# Patient Record
Sex: MALE | Employment: UNEMPLOYED | URBAN - METROPOLITAN AREA
[De-identification: names, ages, dates, MRNs, and addresses within clinical notes are randomized per-mention and may not be internally consistent; named-entity substitution may affect disease eponyms.]

---

## 2021-02-28 ENCOUNTER — HOSPITAL ENCOUNTER (EMERGENCY)
Age: 36
Discharge: HOME | End: 2021-03-01
Attending: EMERGENCY MEDICINE
Payer: MEDICARE

## 2021-02-28 DIAGNOSIS — F20.9 SCHIZOPHRENIA, UNSPECIFIED TYPE (HCC): Primary | ICD-10-CM

## 2021-02-28 PROCEDURE — 99283 EMERGENCY DEPT VISIT LOW MDM: CPT

## 2021-03-01 ENCOUNTER — APPOINTMENT (OUTPATIENT)
Dept: GENERAL RADIOLOGY | Age: 36
End: 2021-03-01
Attending: NURSE PRACTITIONER
Payer: MEDICARE

## 2021-03-01 VITALS
DIASTOLIC BLOOD PRESSURE: 74 MMHG | RESPIRATION RATE: 16 BRPM | WEIGHT: 210 LBS | HEART RATE: 72 BPM | SYSTOLIC BLOOD PRESSURE: 117 MMHG | HEIGHT: 70 IN | OXYGEN SATURATION: 99 % | BODY MASS INDEX: 30.06 KG/M2 | TEMPERATURE: 98.8 F

## 2021-03-01 LAB
ALBUMIN SERPL-MCNC: 3.2 G/DL (ref 3.5–5)
ALBUMIN/GLOB SERPL: 0.9 {RATIO} (ref 1.1–2.2)
ALP SERPL-CCNC: 55 U/L (ref 45–117)
ALT SERPL-CCNC: 35 U/L (ref 12–78)
AMPHET UR QL SCN: NEGATIVE
ANION GAP SERPL CALC-SCNC: 5 MMOL/L (ref 5–15)
APPEARANCE UR: CLEAR
AST SERPL W P-5'-P-CCNC: 60 U/L (ref 15–37)
BACTERIA URNS QL MICRO: NEGATIVE /HPF
BARBITURATES UR QL SCN: NEGATIVE
BASOPHILS # BLD: 0 K/UL (ref 0–0.1)
BASOPHILS NFR BLD: 0 % (ref 0–1)
BENZODIAZ UR QL: NEGATIVE
BILIRUB SERPL-MCNC: 0.7 MG/DL (ref 0.2–1)
BILIRUB UR QL: NEGATIVE
BUN SERPL-MCNC: 18 MG/DL (ref 6–20)
BUN/CREAT SERPL: 20 (ref 12–20)
CA-I BLD-MCNC: 8.7 MG/DL (ref 8.5–10.1)
CANNABINOIDS UR QL SCN: NEGATIVE
CHLORIDE SERPL-SCNC: 105 MMOL/L (ref 97–108)
CO2 SERPL-SCNC: 29 MMOL/L (ref 21–32)
COCAINE UR QL SCN: NEGATIVE
COLOR UR: ABNORMAL
CREAT SERPL-MCNC: 0.91 MG/DL (ref 0.7–1.3)
DIFFERENTIAL METHOD BLD: ABNORMAL
DRUG SCRN COMMENT,DRGCM: NORMAL
EOSINOPHIL # BLD: 0.2 K/UL (ref 0–0.4)
EOSINOPHIL NFR BLD: 3 % (ref 0–7)
ERYTHROCYTE [DISTWIDTH] IN BLOOD BY AUTOMATED COUNT: 13.2 % (ref 11.5–14.5)
GLOBULIN SER CALC-MCNC: 3.4 G/DL (ref 2–4)
GLUCOSE SERPL-MCNC: 105 MG/DL (ref 65–100)
GLUCOSE UR STRIP.AUTO-MCNC: NEGATIVE MG/DL
HCT VFR BLD AUTO: 40.1 % (ref 36.6–50.3)
HGB BLD-MCNC: 13 G/DL (ref 12.1–17)
HGB UR QL STRIP: NEGATIVE
IMM GRANULOCYTES # BLD AUTO: 0 K/UL (ref 0–0.04)
IMM GRANULOCYTES NFR BLD AUTO: 0 % (ref 0–0.5)
KETONES UR QL STRIP.AUTO: NEGATIVE MG/DL
LEUKOCYTE ESTERASE UR QL STRIP.AUTO: NEGATIVE
LYMPHOCYTES # BLD: 1.5 K/UL (ref 0.8–3.5)
LYMPHOCYTES NFR BLD: 31 % (ref 12–49)
MCH RBC QN AUTO: 29.4 PG (ref 26–34)
MCHC RBC AUTO-ENTMCNC: 32.4 G/DL (ref 30–36.5)
MCV RBC AUTO: 90.7 FL (ref 80–99)
METHADONE UR QL: NEGATIVE
MONOCYTES # BLD: 0.7 K/UL (ref 0–1)
MONOCYTES NFR BLD: 14 % (ref 5–13)
NEUTS SEG # BLD: 2.6 K/UL (ref 1.8–8)
NEUTS SEG NFR BLD: 52 % (ref 32–75)
NITRITE UR QL STRIP.AUTO: NEGATIVE
OPIATES UR QL: NEGATIVE
PCP UR QL: NEGATIVE
PH UR STRIP: 7 [PH] (ref 5–8)
PLATELET # BLD AUTO: 135 K/UL (ref 150–400)
PMV BLD AUTO: 10.8 FL (ref 8.9–12.9)
POTASSIUM SERPL-SCNC: 3.6 MMOL/L (ref 3.5–5.1)
PROT SERPL-MCNC: 6.6 G/DL (ref 6.4–8.2)
PROT UR STRIP-MCNC: NEGATIVE MG/DL
RBC # BLD AUTO: 4.42 M/UL (ref 4.1–5.7)
RBC #/AREA URNS HPF: ABNORMAL /HPF (ref 0–5)
SARS-COV-2, COV2: NORMAL
SARS-COV-2, COV2: NOT DETECTED
SODIUM SERPL-SCNC: 139 MMOL/L (ref 136–145)
SP GR UR REFRACTOMETRY: 1.03 (ref 1–1.03)
UA: UC IF INDICATED,UAUC: ABNORMAL
UROBILINOGEN UR QL STRIP.AUTO: 4 EU/DL (ref 0.1–1)
WBC # BLD AUTO: 5 K/UL (ref 4.1–11.1)
WBC URNS QL MICRO: ABNORMAL /HPF (ref 0–4)

## 2021-03-01 PROCEDURE — 85025 COMPLETE CBC W/AUTO DIFF WBC: CPT

## 2021-03-01 PROCEDURE — 80307 DRUG TEST PRSMV CHEM ANLYZR: CPT

## 2021-03-01 PROCEDURE — 87635 SARS-COV-2 COVID-19 AMP PRB: CPT

## 2021-03-01 PROCEDURE — 71045 X-RAY EXAM CHEST 1 VIEW: CPT

## 2021-03-01 PROCEDURE — 36415 COLL VENOUS BLD VENIPUNCTURE: CPT

## 2021-03-01 PROCEDURE — 81001 URINALYSIS AUTO W/SCOPE: CPT

## 2021-03-01 PROCEDURE — 80053 COMPREHEN METABOLIC PANEL: CPT

## 2021-03-01 NOTE — BSMART NOTE
Placement Devorah from D19 reports Pt has been accepted to [facility] pending medications being called into pharmacy. Writer will follow up w/ psychiatry regarding meds and report back to D19.

## 2021-03-01 NOTE — ED NOTES
Bedside and Verbal shift change report given to Jacey Simon RN (oncoming nurse) by Renzo Sepulveda RN (offgoing nurse). Report included the following information SBAR & care transferred.

## 2021-03-01 NOTE — BSMART NOTE
A face to face assessment was done in Rm 2 with Mitra JACOBS on [employer] for assessment. Pt is  30year old male brought to the ER by police under a paperless ECO for a psychiatric evaluation. Pt was alert and oriented x3, appearance was disheveled and malodorous, affect was anxious, behaviors were WNL, attitude was easily distracted otherwise cooperative, eye contact was intact, speech was stuttered, thought process was slow, thought content was focused, insight and judgment was poor. Pt was not observed to be responding to internal stimuli and denied AVH. According to the police, pt was found wandering on the streets and is reported to be a missing adult in the state of [place]. Pt reported to writer and D19 that he is asking to be helped to get services. D19 evaluator asked pt what services he needed and pt reported that he wants to go live in a group home or get a place for himself. Pt went on to report that he does not want to go back to his mother's pace. Pt shared that he and his mother do not get along and that when they get into arguments/ physical fights his mother calls the police on him. Pt reported Lokesh Casas has called the police 27 times\". Pt reported that its usually his mother who starts this fights and it usually ends up with her calling the police on him. Pt reported that he wants to be independent from his mother and would like to get services that can assist him with that. Pt reported that he receives $800 check from [employer]. Pt denied SI/HI and AVH. Pt shared that he has not slept in a few days. Pt reported his appetite to be good but reported that he had not eaten all day. Pt reported that he drinks alcohol occasionally. He reported that he has not had an alcoholic drink in\" a long time. \" Pt reported history of IP treatment with most recent hospitalization at [address] [place] [address] is currently not receiving any OP treatment and is not on any medication. Pt denied legal and medical history. Pt reported that he used to be in special classes while he was in school. Pt listed his friend Santiago Vaughn as his support system. He reported that his plan was to go to [place] to see Santiago Vaughn who he reports is his girlfriend. Pt is currently homeless but reported that he was living with his mother. He reported that he has lived with his mother for 8 years. Mitra AVITIA9 and Avril clemons Reach evaluated pt. Awaiting for D19 dispostion. Attempted to call Dr. Siria Vásquez x4, with no response. Writer left a detailed voicemail asking her to call back. At [address] [facility] called and reported that they are working on getting pt at the [address] [place] [address] or at [facility] stabilization Unit.

## 2021-03-01 NOTE — ED PROVIDER NOTES
EMERGENCY DEPARTMENT HISTORY AND PHYSICAL EXAM      Date: 2/28/2021  Patient Name: Abhay Jiménez    History of Presenting Illness     Chief Complaint   Patient presents with    Other       History Provided By: Behavioral health and Law Enforcement    HPI: Abhay Jiménez, 30 y.o. male with a past medical history significant for intellectual disability and schizophrenia presents to the ED accompanied by police for evaluation. Patient was found walking along the street. He has been identified as a missing adult from [place]. Patient is a poor historian. He repeatedly states he is going to [place] as he does not want to live with his mother anymore. He states he and his mother fight all the time and the police come to his house often. He advises she does not have POA over him and he would like to [employer]\". He denies any previous medical history however he states he has been in and out of the hospital multiple times for unknown reasons. He admits he has been given medications previously but he is unsure what for. He denies any complaints today, he specifically denies fever/chills, chest pain, shortness of breath, N/V or dizziness. He denies any SI/HI or hallucinations. He admits to poor sleeping recently. PCP: None    No current facility-administered medications on file prior to encounter. No current outpatient medications on file prior to encounter. Past History     Past Medical History:  History reviewed. No pertinent past medical history. Past Surgical History:  History reviewed. No pertinent surgical history. Family History:  History reviewed. No pertinent family history.     Social History:  Social History     Tobacco Use    Smoking status: Never Smoker    Smokeless tobacco: Never Used   Substance Use Topics    Alcohol use: Never     Frequency: Never    Drug use: Never       Allergies:  No Known Allergies      Review of Systems     Review of Systems   Constitutional: Negative for chills and fever. Respiratory: Negative. Negative for shortness of breath. Cardiovascular: Negative. Negative for chest pain. Gastrointestinal: Negative for nausea and vomiting. Neurological: Negative. Negative for dizziness. Psychiatric/Behavioral: Positive for sleep disturbance. Negative for hallucinations and suicidal ideas. All other systems reviewed and are negative. Physical Exam     Physical Exam  Vitals signs and nursing note reviewed. Constitutional:       General: He is not in acute distress. Appearance: He is overweight. Comments: Unkempt   HENT:      Head: Normocephalic and atraumatic. Eyes:      Extraocular Movements: Extraocular movements intact. Conjunctiva/sclera: Conjunctivae normal.      Pupils: Pupils are equal, round, and reactive to light. Neck:      Musculoskeletal: Normal range of motion and neck supple. Cardiovascular:      Rate and Rhythm: Normal rate and regular rhythm. Heart sounds: Normal heart sounds. Pulmonary:      Effort: Pulmonary effort is normal.      Breath sounds: Normal breath sounds. No wheezing or rales. Abdominal:      General: Bowel sounds are normal.      Palpations: Abdomen is soft. Tenderness: There is no abdominal tenderness. Skin:     General: Skin is warm and dry. Neurological:      Mental Status: He is alert and oriented to person, place, and time. Psychiatric:         Mood and Affect: Mood normal.         Speech: Speech is delayed. Behavior: Behavior normal. Behavior is cooperative.          Lab and Diagnostic Study Results     Labs -     Recent Results (from the past 12 hour(s))   SARS-COV-2    Collection Time: 03/01/21  3:30 AM   Result Value Ref Range    SARS-CoV-2 Please find results under separate order     CBC WITH AUTOMATED DIFF    Collection Time: 03/01/21  3:50 AM   Result Value Ref Range    WBC 5.0 4.1 - 11.1 K/uL    RBC 4.42 4.10 - 5.70 M/uL    HGB 13.0 12.1 - 17.0 g/dL    HCT 40.1 36.6 - 50.3 %    MCV 90.7 80.0 - 99.0 FL    MCH 29.4 26.0 - 34.0 PG    MCHC 32.4 30.0 - 36.5 g/dL    RDW 13.2 11.5 - 14.5 %    PLATELET 552 (L) 720 - 400 K/uL    MPV 10.8 8.9 - 12.9 FL    NEUTROPHILS 52 32 - 75 %    LYMPHOCYTES 31 12 - 49 %    MONOCYTES 14 (H) 5 - 13 %    EOSINOPHILS 3 0 - 7 %    BASOPHILS 0 0 - 1 %    IMMATURE GRANULOCYTES 0 0.0 - 0.5 %    ABS. NEUTROPHILS 2.6 1.8 - 8.0 K/UL    ABS. LYMPHOCYTES 1.5 0.8 - 3.5 K/UL    ABS. MONOCYTES 0.7 0.0 - 1.0 K/UL    ABS. EOSINOPHILS 0.2 0.0 - 0.4 K/UL    ABS. BASOPHILS 0.0 0.0 - 0.1 K/UL    ABS. IMM. GRANS. 0.0 0.00 - 0.04 K/UL    DF AUTOMATED     METABOLIC PANEL, COMPREHENSIVE    Collection Time: 03/01/21  3:50 AM   Result Value Ref Range    Sodium 139 136 - 145 mmol/L    Potassium 3.6 3.5 - 5.1 mmol/L    Chloride 105 97 - 108 mmol/L    CO2 29 21 - 32 mmol/L    Anion gap 5 5 - 15 mmol/L    Glucose 105 (H) 65 - 100 mg/dL    BUN 18 6 - 20 mg/dL    Creatinine 0.91 0.70 - 1.30 mg/dL    BUN/Creatinine ratio 20 12 - 20      GFR est AA >60 >60 ml/min/1.73m2    GFR est non-AA >60 >60 ml/min/1.73m2    Calcium 8.7 8.5 - 10.1 mg/dL    Bilirubin, total 0.7 0.2 - 1.0 mg/dL    AST (SGOT) 60 (H) 15 - 37 U/L    ALT (SGPT) 35 12 - 78 U/L    Alk. phosphatase 55 45 - 117 U/L    Protein, total 6.6 6.4 - 8.2 g/dL    Albumin 3.2 (L) 3.5 - 5.0 g/dL    Globulin 3.4 2.0 - 4.0 g/dL    A-G Ratio 0.9 (L) 1.1 - 2.2         Radiologic Studies -   @lastxrresult@  CT Results  (Last 48 hours)    None        CXR Results  (Last 48 hours)    None            Medical Decision Making   - I am the first provider for this patient. - I reviewed the vital signs, available nursing notes, past medical history, past surgical history, family history and social history. - Initial assessment performed. The patients presenting problems have been discussed, and they are in agreement with the care plan formulated and outlined with them.   I have encouraged them to ask questions as they arise throughout their visit. Vital Signs-Reviewed the patient's vital signs. Patient Vitals for the past 12 hrs:   Temp Pulse Resp BP SpO2   03/01/21 0016     99 %   02/28/21 2252 98.8 °F (37.1 °C) 84 16 (!) 131/91 99 %       Records Reviewed: Nursing Notes      ED Course:     ED Course as of Mar 05 1242   Mon Mar 01, 2021   3414 Per behavioral health/[facility] 19 patient is a candidate for REACH. Basic lab screening and Covid test requested for medical clearance    [LP]   0658 Sodium: 139 [HP]   0658 AMPHETAMINES: Negative [HP]   0658 BARBITURATES: Negative [HP]   0658 BENZODIAZEPINES: Negative [HP]   0658 COCAINE: Negative [HP]   5243 METHADONE: Negative [HP]   5335 OPIATES: Negative [HP]   8384 PCP(PHENCYCLIDINE): Negative [HP]   0658 THC (TH-CANNABINOL): Negative [HP]   0658 WBC: 5.0 [HP]   0658 HGB: 13.0 [HP]   0658 HCT: 40.1 [HP]   0658 PLATELET(!): 858 [HP]   0658 Leukocyte Esterase: Negative [HP]   0658 UA:UC IF INDICATED: PENDING [HP]   0659 Nitrites: Negative [HP]   0659 Potassium: 3.6 [HP]   0659 Chloride: 105 [HP]   0659 CO2: 29 [HP]   1924 Anion gap: 5 [HP]   0659 Glucose(!): 105 [HP]   0659 BUN: 18 [HP]   0659 BUN/Creatinine ratio: 20 [HP]   1253 GFR est AA: >60 [HP]   7777 Medically clear, REACH placement pending at signover   SARS-CoV-2: Not Detected [HP]      ED Course User Index  [HP] Sonali White MD  [LP] Naldo Silva NP     BEDSIDE SIGN OUT:  5:29 AM  I have discussed pt's hx, disposition, and available diagnostic and imaging results with Dr Claudell Moles at bedside with the patient. Reviewed care plans. Both providers and patient are in agreement with care plan. CLARISA Craig is transferring care of the pt to Dr Claudell Moles at this time. Ashley Joya NP    Provider Notes (Medical Decision Making):   Patient presents accompanied by police as an ECO after found walking down the street. He was identified as a missing adult from [place].   History is limited as patient is a poor historian and has intellectual disability. Patient has no specific complaints, no SI/HI or hallucinations. District  consulted, plan is to transfer patient to [facility] after medical clearance. Cleared medically and by psychiatry, Dr Charisma Hernandez for transfer. MDM       Disposition   Disposition: Condition stable  DC-discharged to crisis stabilization unit, RBHA and regimen        DISCHARGE PLAN:  1. There are no discharge medications for this patient. 2.   Follow-up Information    None       3. Return to ED if worse   4. There are no discharge medications for this patient. Diagnosis     Clinical Impression:   1. Schizophrenia, unspecified type [facility])        Attestations:    Myra Victor NP    Please note that this dictation was completed with [employer], the computer voice recognition software. Quite often unanticipated grammatical, syntax, homophones, and other interpretive errors are inadvertently transcribed by the computer software. Please disregard these errors. Please excuse any errors that have escaped final proofreading. Thank you.

## 2021-03-01 NOTE — ED NOTES
Gave report to EMS. Chart provided. Called Avril at receiving of ETA. Pt alert, maew, skin w/d. Cooperative. Belongings with patient. Transfer packet completed.

## 2021-03-01 NOTE — CONSULTS
[address]    Name:  Elaina Lombard  MR#:  [id number]  :  1985  ACCOUNT #:  [de-identified]  DATE OF SERVICE:  2021    PSYCHIATRIC CONSULTATION    DATE OF ADMISSION:  2021    REASON FOR CONSULTATION:  Psych consult, medications for disposition. This is a 29-year-old single [facility] male patient apparently brought to the emergency room under ECO by [facility] [employer] for psychiatric evaluation and disposition. Telephone contact by intake staff last night and this morning, the patient was seen face-to-face today, wants help, homeless. HISTORY OF PRESENT ILLNESS:  Apparently, this is a missing person from [place], the patient with history of chronic mental illness, some intellectual disability, reportedly left [place] and wandering here in [place] . Police department picked him up and he was asking to get services. Apparently, on the way, he was hospitalized in [facility], claims to be on fluphenazine one tablet, Cogentin one tablet 1 mg, trazodone 50 mg, does not know the doses and the frequency. He does not want to be placed in a group home. He says he and his mother live together. She constantly calls police on him and apparently at least called 27 times. He does not want to go back. He wants to be independent of his mother. They do not get along with each other. Reportedly, he gets 800 dollars social security check from [facility]. Mother fights with him, denied any auditory or visual hallucinations, suicidal or homicidal ideations, but little bit disheveled, not slept in a few days, appetite to be good but not eaten today. PAST HISTORY:  Admissions:  Most recently in [facility]. SUBSTANCE ABUSE:  Alcohol occasionally. No drug abuse. Drug screen was negative. TRAUMA:  Not known. PHYSICAL:  Unremarkable. ALLERGIES TO MEDICATIONS:   NO KNOWN ALLERGIES TO MEDICATIONS.     FAMILY HISTORY:  Unknown, but he and his mom fight.    MENTAL STATUS EXAM:  Looks stated age. Alert, verbal, little bit guarded, flat affect. Denied suicidal thoughts or homicidal thoughts, does not appear to be having hallucinations or delusions. Seems to be intellectually limited, insight limited, judgment poor by history but does want to be helped with housing, medications, etc.  No agitation. No aggression. Not suicidal or homicidal.  Oriented to place, person, and day. DIAGNOSES:  Schizophrenia, chronic undifferentiated with acute exacerbation, mild-to-moderate intellectual disability. DISPOSITION:  The patient not suicidal or homicidal, not overly psychotic but somewhat limited. Intake worked with him and REACH program, able to find a crisis stabilization unit, [facility] in [place]. I was asked to call, they need a 30-day supply of medications. Thirty-day supply of medications was called into [facility], [phone], fluphenazine 10 mg one at night, 30 tablets; no refills; Cogentin 1 mg b.i.d., #60, no refills; trazodone 50 mg one at bedtime p.r.n. for insomnia, #30; no refills. Discharged as improved.       MD LUAN [email or web address]  D:  03/01/2021 11:33  T:  03/01/2021 14:58  JOB #:  [id number]

## 2021-03-01 NOTE — ED NOTES
Pt awakened to eat his breakfast. States he is doing fine and feels he can manage eating his breakfast

## 2021-03-01 NOTE — ED TRIAGE NOTES
Pd was called for assist on someone walking the streets. Jefferson ran the patient finding that he was a missing adult from [place]. Rekha.  arrive with paperless eco.

## 2021-03-01 NOTE — BSMART NOTE
Meds 
 
Pts meds can be called in to [facility] [address] [place] [address] [phone] According to chart Pt was on Fluphenazine Cogentin 1mg Haldol 2mg Trazodone 50 mg

## 2021-10-27 ENCOUNTER — OFFICE VISIT (OUTPATIENT)
Dept: PODIATRY | Age: 36
End: 2021-10-27
Payer: MEDICARE

## 2021-10-27 VITALS
HEIGHT: 70 IN | BODY MASS INDEX: 41.49 KG/M2 | WEIGHT: 289.8 LBS | SYSTOLIC BLOOD PRESSURE: 141 MMHG | TEMPERATURE: 98.3 F | HEART RATE: 82 BPM | DIASTOLIC BLOOD PRESSURE: 88 MMHG

## 2021-10-27 DIAGNOSIS — B35.3 TINEA PEDIS OF BOTH FEET: Primary | ICD-10-CM

## 2021-10-27 PROCEDURE — 99203 OFFICE O/P NEW LOW 30 MIN: CPT | Performed by: PODIATRIST

## 2021-10-27 PROCEDURE — G8432 DEP SCR NOT DOC, RNG: HCPCS | Performed by: PODIATRIST

## 2021-10-27 PROCEDURE — G8427 DOCREV CUR MEDS BY ELIG CLIN: HCPCS | Performed by: PODIATRIST

## 2021-10-27 PROCEDURE — G8417 CALC BMI ABV UP PARAM F/U: HCPCS | Performed by: PODIATRIST

## 2021-10-27 RX ORDER — TRAZODONE HYDROCHLORIDE 150 MG/1
150 TABLET ORAL
COMMUNITY

## 2021-10-27 RX ORDER — RISPERIDONE 2 MG/1
2 TABLET, FILM COATED ORAL
COMMUNITY
End: 2022-10-12 | Stop reason: DRUGHIGH

## 2021-10-27 RX ORDER — CLOTRIMAZOLE AND BETAMETHASONE DIPROPIONATE 10; .64 MG/G; MG/G
CREAM TOPICAL 2 TIMES DAILY
Qty: 45 G | Refills: 0 | Status: SHIPPED | OUTPATIENT
Start: 2021-10-27

## 2021-10-27 NOTE — PROGRESS NOTES
1. Have you been to the ER, urgent care clinic since your last visit? Hospitalized since your last visit? No    2. Have you seen or consulted any other health care providers outside of the [address] [place] [address] since your last visit? Include any pap smears or colon screening.  No     Chief Complaint   Patient presents with    Athlete's Foot     bilateral itching/sweaty feet

## 2021-10-28 LAB
CREATININE, EXTERNAL: 1.07
LDL-C, EXTERNAL: 159
TOTAL CHOLESTEROL, NCHOLT: 231

## 2021-11-22 NOTE — PROGRESS NOTES
[facility]    Subjective:         Patient is a 30 y.o. male who is being seen as a new pt for an itchy rash to both of his feet. Patient states he has suffered for the past few weeks with this issue. He states he tried multiple over-the-counter creams/lotions/sprays/powders without relief of his symptoms. He states his feet are also very sweaty. He denies any overt pain but states they are very itchy. He denies any breaks in the skin. He denies any systemic signs of infection. He denies any recent trauma. He denies any changes in his activity level or shoe gear. He denies any other pedal complaints    History reviewed. No pertinent past medical history. History reviewed. No pertinent surgical history. History reviewed. No pertinent family history. Social History     Tobacco Use    Smoking status: Never Smoker    Smokeless tobacco: Never Used   Substance Use Topics    Alcohol use: Never     No Known Allergies  Prior to Admission medications    Medication Sig Start Date End Date Taking? Authorizing Provider   clotrimazole-betamethasone (LOTRISONE) topical cream Apply  to affected area two (2) times a day. 10/27/21  Yes Colton Black DPM   risperiDONE (RisperDAL) 2 mg tablet Take 2 mg by mouth. Yes Provider, Historical   traZODone (DESYREL) 150 mg tablet Take 150 mg by mouth nightly. Yes Provider, Historical       Review of Systems   Constitutional: Negative. HENT: Negative. Eyes: Negative. Respiratory: Negative. Cardiovascular: Negative. Gastrointestinal: Negative. Endocrine: Negative. Genitourinary: Negative. Musculoskeletal: Negative. Skin: Negative. Allergic/Immunologic: Negative. Neurological: Negative. Hematological: Negative. Psychiatric/Behavioral: Positive for sleep disturbance. All other systems reviewed and are negative.       Objective:     Visit Vitals  BP (!) 141/88 (BP 1 Location: Left upper arm, BP Patient Position: Sitting, BP Cuff Size: Adult)   Pulse 82   Temp 98.3 °F (36.8 °C) (Temporal)   Ht 5' 10\" (1.778 m)   Wt 289 lb 12.8 oz (131.5 kg)   BMI 41.58 kg/m²       Physical Exam  Vitals reviewed. Constitutional:       Appearance: He is morbidly obese. Cardiovascular:      Pulses:           Dorsalis pedis pulses are 2+ on the right side and 2+ on the left side. Posterior tibial pulses are 2+ on the right side and 2+ on the left side. Pulmonary:      Effort: Pulmonary effort is normal.   Musculoskeletal:      Right lower leg: No edema. Left lower leg: No edema. Right foot: Normal range of motion. No deformity or bunion. Left foot: Normal range of motion. No deformity or bunion. Feet:      Right foot:      Protective Sensation: 10 sites tested. 10 sites sensed. Skin integrity: Dry skin present. Toenail Condition: Right toenails are normal.      Left foot:      Protective Sensation: 10 sites tested. 10 sites sensed. Skin integrity: Dry skin present. Toenail Condition: Left toenails are normal.   Lymphadenopathy:      Lower Body: No right inguinal adenopathy. No left inguinal adenopathy. Skin:     General: Skin is warm. Capillary Refill: Capillary refill takes 2 to 3 seconds. Findings: Rash present. Neurological:      Mental Status: He is alert and oriented to person, place, and time. Psychiatric:         Mood and Affect: Mood and affect normal.         Behavior: Behavior is cooperative. Data Review: No results found for this or any previous visit (from the past 24 hour(s)). Impression:       ICD-10-CM ICD-9-CM    1.  Tinea pedis of both feet  B35.3 110.4          Recommendation:     Patient seen and evaluated in the office  Discussed and educated patient regarding her current medical condition  A prescription was given for a topical steroid/antifungal cream to be utilized twice daily for a total of 2 weeks for symptomatic relief        Beronica Jameson Claudette Silva, DPWILLIE, CWSP, [facility]  [address] [facility] [address], [address] [place] [address], [address] [place] [address]  O: [phone]  F: [phone]  C: [phone]

## 2021-12-16 ENCOUNTER — OFFICE VISIT (OUTPATIENT)
Dept: PODIATRY | Age: 36
End: 2021-12-16
Payer: MEDICARE

## 2021-12-16 VITALS
HEIGHT: 70 IN | WEIGHT: 298.4 LBS | SYSTOLIC BLOOD PRESSURE: 148 MMHG | DIASTOLIC BLOOD PRESSURE: 93 MMHG | HEART RATE: 99 BPM | TEMPERATURE: 97.7 F | BODY MASS INDEX: 42.72 KG/M2

## 2021-12-16 DIAGNOSIS — M79.671 RIGHT FOOT PAIN: Primary | ICD-10-CM

## 2021-12-16 PROCEDURE — 99213 OFFICE O/P EST LOW 20 MIN: CPT | Performed by: PODIATRIST

## 2021-12-16 PROCEDURE — G8432 DEP SCR NOT DOC, RNG: HCPCS | Performed by: PODIATRIST

## 2021-12-16 PROCEDURE — G8427 DOCREV CUR MEDS BY ELIG CLIN: HCPCS | Performed by: PODIATRIST

## 2021-12-16 PROCEDURE — G8417 CALC BMI ABV UP PARAM F/U: HCPCS | Performed by: PODIATRIST

## 2021-12-16 RX ORDER — DICLOFENAC SODIUM 10 MG/G
4 GEL TOPICAL 4 TIMES DAILY
Qty: 350 G | Refills: 2 | Status: SHIPPED | OUTPATIENT
Start: 2021-12-16

## 2021-12-16 NOTE — PROGRESS NOTES
1. Have you been to the ER, urgent care clinic since your last visit? Hospitalized since your last visit? No    2. Have you seen or consulted any other health care providers outside of the [address] [place] [address] since your last visit? Include any pap smears or colon screening.  No     Chief Complaint   Patient presents with    Foot Pain     bilateral foot pain

## 2021-12-23 ENCOUNTER — HOSPITAL ENCOUNTER (OUTPATIENT)
Dept: GENERAL RADIOLOGY | Age: 36
Discharge: HOME | End: 2021-12-23
Payer: MEDICARE

## 2021-12-23 ENCOUNTER — TRANSCRIBE ORDER (OUTPATIENT)
Dept: EMERGENCY DEPT | Age: 36
End: 2021-12-23

## 2021-12-23 DIAGNOSIS — M79.671 RIGHT FOOT PAIN: Primary | ICD-10-CM

## 2021-12-23 DIAGNOSIS — M79.671 RIGHT FOOT PAIN: ICD-10-CM

## 2021-12-23 PROCEDURE — 73630 X-RAY EXAM OF FOOT: CPT

## 2022-01-04 NOTE — PROGRESS NOTES
[facility]    Subjective:         Patient is a 29 y.o. male who is being seen as a returning pt for right foot pain. Patient states he has been suffering with the right foot pain for the past few days, now with pain rising the level of 7 out of 10. He denies any breaks in the skin. He denies any systemic signs of infection. He denies any recent trauma. He denies any changes in his activity level or shoe gear. He denies any other pedal complaints    History reviewed. No pertinent past medical history. History reviewed. No pertinent surgical history. History reviewed. No pertinent family history. Social History     Tobacco Use    Smoking status: Never Smoker    Smokeless tobacco: Never Used   Substance Use Topics    Alcohol use: Never     No Known Allergies  Prior to Admission medications    Medication Sig Start Date End Date Taking? Authorizing Provider   diclofenac (VOLTAREN) 1 % gel Apply 4 g to affected area four (4) times daily. 12/16/21  Yes Broderick Black DPM   clotrimazole-betamethasone (LOTRISONE) topical cream Apply  to affected area two (2) times a day. 10/27/21   Broderick Black DPM   risperiDONE (RisperDAL) 2 mg tablet Take 2 mg by mouth. Provider, Historical   traZODone (DESYREL) 150 mg tablet Take 150 mg by mouth nightly. Provider, Historical       Review of Systems   Constitutional: Negative. HENT: Negative. Eyes: Negative. Respiratory: Negative. Cardiovascular: Negative. Gastrointestinal: Negative. Endocrine: Negative. Genitourinary: Negative. Musculoskeletal: Negative. Skin: Negative. Allergic/Immunologic: Negative. Neurological: Negative. Hematological: Negative. Psychiatric/Behavioral: Positive for sleep disturbance. All other systems reviewed and are negative.       Objective:     Visit Vitals  BP (!) 148/93 (BP 1 Location: Right upper arm, BP Patient Position: Sitting, BP Cuff Size: Large adult) Pulse 99   Temp 97.7 °F (36.5 °C) (Temporal)   Ht 5' 10\" (1.778 m)   Wt 298 lb 6.4 oz (135.4 kg)   BMI 42.82 kg/m²       Physical Exam  Vitals reviewed. Constitutional:       Appearance: He is morbidly obese. Cardiovascular:      Pulses:           Dorsalis pedis pulses are 2+ on the right side and 2+ on the left side. Posterior tibial pulses are 2+ on the right side and 2+ on the left side. Pulmonary:      Effort: Pulmonary effort is normal.   Musculoskeletal:      Right lower leg: No edema. Left lower leg: No edema. Right foot: Normal range of motion. No deformity or bunion. Left foot: Normal range of motion. No deformity or bunion. Feet:      Right foot:      Protective Sensation: 10 sites tested. 10 sites sensed. Skin integrity: Dry skin present. Toenail Condition: Right toenails are normal.      Left foot:      Protective Sensation: 10 sites tested. 10 sites sensed. Skin integrity: Dry skin present. Toenail Condition: Left toenails are normal.   Lymphadenopathy:      Lower Body: No right inguinal adenopathy. No left inguinal adenopathy. Skin:     General: Skin is warm. Capillary Refill: Capillary refill takes 2 to 3 seconds. Neurological:      Mental Status: He is alert and oriented to person, place, and time. Psychiatric:         Mood and Affect: Mood and affect normal.         Behavior: Behavior is cooperative. Data Review: No results found for this or any previous visit (from the past 24 hour(s)). Impression:       ICD-10-CM ICD-9-CM    1. Right foot pain  M79.671 729.5 XR FOOT RT MIN 3 V         Recommendation:     Patient seen and evaluated in the office  Discussed and educated patient regarding her current medical condition  X-rays to be performed of the right foot to interrogate the osseous structures.   When performed, will discuss treatment options in more depth  A prescription was given for diclofenac 1% topical gel to be applied up to 4 times daily for symptomatic relief      Mahin Dillon, [address] [facility], [address] [place] [address] and Rosetta Carrion [address]  O: [phone]  F: [phone]  C: [phone]

## 2022-10-12 VITALS
DIASTOLIC BLOOD PRESSURE: 80 MMHG | BODY MASS INDEX: 34.99 KG/M2 | HEART RATE: 76 BPM | WEIGHT: 264 LBS | HEIGHT: 73 IN | TEMPERATURE: 98.3 F | SYSTOLIC BLOOD PRESSURE: 108 MMHG

## 2022-10-12 PROBLEM — R73.01 ELEVATED FASTING GLUCOSE: Status: ACTIVE | Noted: 2022-10-12

## 2022-10-12 PROBLEM — E78.2 MIXED HYPERLIPIDEMIA: Status: ACTIVE | Noted: 2022-10-12

## 2022-10-12 PROBLEM — R03.0 ELEVATED BLOOD PRESSURE READING WITHOUT DIAGNOSIS OF HYPERTENSION: Status: ACTIVE | Noted: 2022-10-12

## 2022-10-12 PROBLEM — R06.83 SNORING: Status: ACTIVE | Noted: 2022-10-12

## 2022-10-12 PROBLEM — F31.9 BIPOLAR DISORDER (HCC): Status: ACTIVE | Noted: 2022-10-12

## 2022-10-12 PROBLEM — E66.9 OBESITY: Status: ACTIVE | Noted: 2022-10-12

## 2022-10-12 RX ORDER — ATORVASTATIN CALCIUM 10 MG/1
10 TABLET, FILM COATED ORAL
COMMUNITY
Start: 2022-10-01

## 2022-10-12 RX ORDER — RISPERIDONE 1 MG/1
1 TABLET, FILM COATED ORAL DAILY
COMMUNITY
Start: 2022-10-01

## 2022-10-12 RX ORDER — RISPERIDONE 3 MG/1
3 TABLET, FILM COATED ORAL DAILY
COMMUNITY
Start: 2022-10-01

## 2022-10-12 RX ORDER — METOPROLOL SUCCINATE 25 MG/1
25 TABLET, EXTENDED RELEASE ORAL DAILY
COMMUNITY
Start: 2022-10-01

## 2022-10-18 ENCOUNTER — OFFICE VISIT (OUTPATIENT)
Dept: INTERNAL MEDICINE CLINIC | Age: 37
End: 2022-10-18
Payer: MEDICARE

## 2022-10-18 VITALS
DIASTOLIC BLOOD PRESSURE: 80 MMHG | OXYGEN SATURATION: 96 % | SYSTOLIC BLOOD PRESSURE: 122 MMHG | TEMPERATURE: 97.9 F | HEIGHT: 74 IN | BODY MASS INDEX: 33.3 KG/M2 | WEIGHT: 259.5 LBS | HEART RATE: 63 BPM

## 2022-10-18 DIAGNOSIS — E78.2 MIXED HYPERLIPIDEMIA: ICD-10-CM

## 2022-10-18 DIAGNOSIS — R73.01 ELEVATED FASTING GLUCOSE: ICD-10-CM

## 2022-10-18 DIAGNOSIS — R05.9 COUGH, UNSPECIFIED TYPE: ICD-10-CM

## 2022-10-18 DIAGNOSIS — R03.0 ELEVATED BLOOD PRESSURE READING WITHOUT DIAGNOSIS OF HYPERTENSION: ICD-10-CM

## 2022-10-18 DIAGNOSIS — E66.9 OBESITY, UNSPECIFIED CLASSIFICATION, UNSPECIFIED OBESITY TYPE, UNSPECIFIED WHETHER SERIOUS COMORBIDITY PRESENT: Primary | ICD-10-CM

## 2022-10-18 PROBLEM — E78.00 HYPERCHOLESTEROLEMIA: Status: ACTIVE | Noted: 2022-10-18

## 2022-10-18 PROCEDURE — 99214 OFFICE O/P EST MOD 30 MIN: CPT | Performed by: INTERNAL MEDICINE

## 2022-10-18 RX ORDER — ACETAMINOPHEN 325 MG/1
325 TABLET ORAL
COMMUNITY

## 2022-10-18 RX ORDER — DEXTROMETHORPHAN POLISTIREX 30 MG/5ML
60 SUSPENSION ORAL 2 TIMES DAILY
Qty: 200 ML | Refills: 0 | Status: SHIPPED | OUTPATIENT
Start: 2022-10-18 | End: 2022-10-28

## 2022-10-18 NOTE — ASSESSMENT & PLAN NOTE
Advised to continue his healthy diet, exercise and weight loss. He lost 5 pounds from previous visit.

## 2022-10-18 NOTE — PROGRESS NOTES
[address] [facility]  [address]  [place], [address]  Phone: [phone]      Cathy Moore (: 1985) is a 29 y.o. male, established patient, here for evaluation of the following chief complaint(s):  Follow Up Chronic Condition and Hypertension         SUBJECTIVE/OBJECTIVE:  HPI:  Cathy Moore is being seen today for follow up. He is accompanied with group home director [address] [place] [address], [facility]. Ms Karin Denton informed the previous caregiver has left the group home and she is not sure what he was supposed to do before this appointment. He has not had any recent labwork. He denies any recent hospital, ER, or urgent care visits. He had a sleep study on 10/12/2022 at [facility] and is waiting for the result. He does not need any refills at this time. Patient stated he is trying to eat better, do exercise and trying to lose weight. Group home director asked since the flu season is coming whether he can have  as needed cough medicine in case if he needs. Prior to Admission medications    Medication Sig Start Date End Date Taking? Authorizing Provider   acetaminophen (TYLENOL) 325 mg tablet Take 325 mg by mouth every six (6) hours as needed for Pain. Yes Provider, Historical   dextromethorphan (Delsym) 30 mg/5 mL liquid Take 10 mL by mouth two (2) times a day for 10 days. 10/18/22 10/28/22 Yes Komal Bills MD   atorvastatin (LIPITOR) 10 mg tablet Take 10 mg by mouth nightly. 10/1/22  Yes Provider, Historical   metoprolol succinate (TOPROL-XL) 25 mg XL tablet Take 25 mg by mouth daily. 10/1/22  Yes Provider, Historical   risperiDONE (RisperDAL) 1 mg tablet Take 1 mg by mouth daily. 10/1/22  Yes Provider, Historical   risperiDONE (RisperDAL) 3 mg tablet Take 3 mg by mouth daily. 10/1/22  Yes Provider, Historical   traZODone (DESYREL) 150 mg tablet Take 150 mg by mouth nightly.    Yes Provider, Historical   diclofenac (VOLTAREN) 1 % gel Apply 4 g to affected area four (4) times daily. Patient not taking: Reported on 10/18/2022 12/16/21   Brodie Black, DPM   clotrimazole-betamethasone (LOTRISONE) topical cream Apply  to affected area two (2) times a day. Patient not taking: Reported on 10/18/2022 10/27/21   Freemanne Randy, [facility]        Not on File     Past Medical History:   Diagnosis Date    Bipolar disorder ([address]     Diastolic blood pressure 90 mm Hg or higher     Elevated fasting glucose     Hypercholesterolemia     Obesity (BMI 30-39. 9)     Snoring         Family History   Family history unknown: Yes        History reviewed. No pertinent surgical history. Review of Systems   Constitutional:  Negative for chills and fever. HENT:  Negative for congestion, ear pain, nosebleeds, sinus pain, sore throat and tinnitus. Eyes:  Negative for redness. Respiratory:  Negative for cough and shortness of breath. Cardiovascular:  Negative for chest pain and palpitations. Gastrointestinal:  Negative for abdominal pain, diarrhea, nausea and vomiting. Endocrine: Negative for cold intolerance and polyuria. Genitourinary:  Negative for dysuria and hematuria. Musculoskeletal:  Negative for back pain and neck pain. Skin:  Negative for rash. Neurological:  Negative for dizziness and headaches. Psychiatric/Behavioral: Negative. The patient is not nervous/anxious. /80   Pulse 63   Temp 97.9 °F (36.6 °C) (Temporal)   Ht 6' 2\" (1.88 m)   Wt 259 lb 8 oz (117.7 kg)   SpO2 96%   BMI 33.32 kg/m²      Physical Exam  Constitutional:       Appearance: Normal appearance. Comments: Patient is with his group home director Ms. Luli Shane. HENT:      Head: Normocephalic and atraumatic.       Right Ear: Tympanic membrane and external ear normal.      Left Ear: Tympanic membrane and external ear normal.      Nose: Nose normal.      Mouth/Throat:      Mouth: Mucous membranes are moist.   Eyes:      Extraocular Movements: Extraocular movements intact. Pupils: Pupils are equal, round, and reactive to light. Cardiovascular:      Rate and Rhythm: Normal rate. Pulmonary:      Effort: Pulmonary effort is normal.      Breath sounds: Normal breath sounds. Abdominal:      Palpations: Abdomen is soft. Tenderness: no abdominal tenderness   Musculoskeletal:      Cervical back: Normal range of motion and neck supple. Right lower leg: No edema. Left lower leg: No edema. Skin:     General: Skin is warm and dry. Neurological:      General: No focal deficit present. Mental Status: He is alert and oriented to person, place, and time. Psychiatric:         Mood and Affect: Mood normal.       ASSESSMENT/PLAN:  Below is the assessment and plan developed based on review of pertinent history, physical exam, labs, studies, and medications. 1. Obesity, unspecified classification, unspecified obesity type, unspecified whether serious comorbidity present  Assessment & Plan:  Advised to continue his healthy diet, exercise and weight loss. He lost 5 pounds from previous visit. 2. Elevated fasting glucose  Assessment & Plan:  Advised to decrease sweets, carbohydrates and increase exercise. Orders:  -     HEMOGLOBIN A1C WITH EAG  -     HEMOGLOBIN A1C WITH EAG; Future  3. Mixed hyperlipidemia  Assessment & Plan:  Advise low-fat low-cholesterol diet, will recheck labs new lab slip given to patient. Discussed with group home director regarding the need for blood test.  Orders:  -     LIPID PANEL  -     METABOLIC PANEL, COMPREHENSIVE  -     METABOLIC PANEL, COMPREHENSIVE; Future  -     LIPID PANEL; Future  4. Elevated blood pressure reading without diagnosis of hypertension  Assessment & Plan:  Blood pressure is well controlled, advised to continue current medications and low-sodium diet. 5. Cough, unspecified type  -     dextromethorphan (Delsym) 30 mg/5 mL liquid; Take 10 mL by mouth two (2) times a day for 10 days. , Normal, Disp-200 mL, R-0    Return in about 4 months (around 2/18/2023), or if symptoms worsen or fail to improve. There are no Patient Instructions on file for this visit. Health Maintenance Due   Topic Date Due    Hepatitis C Screening  Never done    COVID-19 Vaccine (1) Never done    A1C test (Diabetic or Prediabetic)  Never done    DTaP/Tdap/Td series (1 - Tdap) Never done    Medicare Yearly Exam  Never done    Flu Vaccine (1) Never done    Lipid Screen  10/28/2022        Aspects of this note may have been generated using voice recognition software. Despite editing, there may be unrecognized errors. An electronic signature was used to authenticate this note.   -- Angie Kang MD

## 2022-10-18 NOTE — PROGRESS NOTES
1. \"Have you been to the ER, urgent care clinic since your last visit? Hospitalized since your last visit? \" No    2. \"Have you seen or consulted any other health care providers outside of the [address] [place] [address] since your last visit? \" Yes When: 10/12/2022, [facility]

## 2022-10-18 NOTE — ASSESSMENT & PLAN NOTE
Advise low-fat low-cholesterol diet, will recheck labs new lab slip given to patient.   Discussed with group home director regarding the need for blood test.

## 2022-11-02 LAB
CREATININE, EXTERNAL: 1.16
HBA1C MFR BLD HPLC: 13 %
LDL-C, EXTERNAL: 107
TOTAL CHOLESTEROL, NCHOLT: 199

## 2022-11-06 NOTE — PROGRESS NOTES
Can you see whether he and his group home caregiver can come for a follow up on 11/10/22 at 2.00PM to discuss the labs,if they can come keep him on the schedule.

## 2022-11-10 RX ORDER — METOPROLOL SUCCINATE 25 MG/1
TABLET, EXTENDED RELEASE ORAL
Qty: 15 TABLET | Refills: 12 | Status: SHIPPED | OUTPATIENT
Start: 2022-11-10

## 2022-11-13 NOTE — PROGRESS NOTES
Can you see whether he and the care taker can come for a follow up on  11/15/22 at 12. Noon to discuss the labs,need to bring all the medications.

## 2022-11-16 ENCOUNTER — OFFICE VISIT (OUTPATIENT)
Dept: INTERNAL MEDICINE CLINIC | Age: 37
End: 2022-11-16
Payer: MEDICARE

## 2022-11-16 VITALS
OXYGEN SATURATION: 97 % | HEIGHT: 73 IN | DIASTOLIC BLOOD PRESSURE: 78 MMHG | BODY MASS INDEX: 33.66 KG/M2 | TEMPERATURE: 98.1 F | HEART RATE: 91 BPM | SYSTOLIC BLOOD PRESSURE: 118 MMHG | WEIGHT: 254 LBS

## 2022-11-16 DIAGNOSIS — E66.9 OBESITY (BMI 30-39.9): ICD-10-CM

## 2022-11-16 DIAGNOSIS — E11.65 UNCONTROLLED TYPE 2 DIABETES MELLITUS WITH HYPERGLYCEMIA (HCC): Primary | ICD-10-CM

## 2022-11-16 DIAGNOSIS — R03.0 DIASTOLIC BLOOD PRESSURE 90 MM HG OR HIGHER: ICD-10-CM

## 2022-11-16 DIAGNOSIS — E78.2 MIXED HYPERLIPIDEMIA: ICD-10-CM

## 2022-11-16 PROCEDURE — 3046F HEMOGLOBIN A1C LEVEL >9.0%: CPT | Performed by: INTERNAL MEDICINE

## 2022-11-16 PROCEDURE — 99214 OFFICE O/P EST MOD 30 MIN: CPT | Performed by: INTERNAL MEDICINE

## 2022-11-16 RX ORDER — FLASH GLUCOSE SCANNING READER
1 EACH MISCELLANEOUS EVERY 2 WEEKS
Qty: 2 EACH | Refills: 4 | Status: SHIPPED | OUTPATIENT
Start: 2022-11-16

## 2022-11-16 RX ORDER — FLASH GLUCOSE SENSOR
1 KIT MISCELLANEOUS EVERY 2 WEEKS
Qty: 2 KIT | Refills: 4 | Status: SHIPPED | OUTPATIENT
Start: 2022-11-16

## 2022-11-16 RX ORDER — FENOFIBRATE 145 MG/1
145 TABLET, COATED ORAL DAILY
Qty: 90 TABLET | Refills: 1 | Status: SHIPPED | OUTPATIENT
Start: 2022-11-16

## 2022-11-16 NOTE — PROGRESS NOTES
Chief Complaint   Patient presents with    Labs    Follow Up Chronic Condition     1. Have you been to the ER, urgent care clinic since your last visit? Hospitalized since your last visit? No    2. Have you seen or consulted any other health care providers outside of the [address] [place] [address] since your last visit? Include any pap smears or colon screening.   No

## 2022-11-16 NOTE — PROGRESS NOTES
[address] [facility]  [address]  [facility], [facility]  Phone: [phone]      Cathy Moore (: 1985) is a 29 y.o. male, established patient, here for evaluation of the following chief complaint(s):  Labs and Follow Up Chronic Condition         SUBJECTIVE/OBJECTIVE:  HPI:  Patient is here for follow up, he recently had blood work. He has Sharade his , with him today. He does not need refills. He needs to get his 2nd Moderna shot and annual Flu shot. Patient stated he has been feeling tired and has dizziness at times. 2022 Glu 457, Creat 1.16, K+ 4.2, Chol T 199, Trig 352, HDL 31, VLDL 61, , A1c 13.0. He does not check his glucose at home. Prior to Admission medications    Medication Sig Start Date End Date Taking? Authorizing Provider   flash glucose sensor (FreeStyle Compa 14 Day Sensor) kit 1 Each by Does Not Apply route Once every 2 weeks. 22  Yes Komal Bills MD   flash glucose scanning reader (FreeStyle Compa 14 Day [place]) misc 1 Each by Does Not Apply route Once every 2 weeks. 22  Yes Komal Bills MD   SITagliptin-metFORMIN [facility]) 50-1,000 mg per tablet 1 tab daily for 2 weeks and then 1 tab twice a day 22  Yes Komal Bills MD   empagliflozin (JARDIANCE) 10 mg tablet Take 1 Tablet by mouth daily. 22  Yes Komal Bills MD   fenofibrate nanocrystallized (TRICOR) 145 mg tablet Take 1 Tablet by mouth daily. 22  Yes Komal Bills MD   metoprolol succinate (TOPROL-XL) 25 mg XL tablet TAKE 1/2 TABLET BY MOUTH AT BEDTIME 11/10/22  Yes Komal Bills MD   acetaminophen (TYLENOL) 325 mg tablet Take 325 mg by mouth every six (6) hours as needed for Pain. Yes Provider, Historical   atorvastatin (LIPITOR) 10 mg tablet Take 10 mg by mouth nightly. 10/1/22  Yes Provider, Historical   risperiDONE (RisperDAL) 1 mg tablet Take 1 mg by mouth daily.  10/1/22  Yes Provider, Historical risperiDONE (RisperDAL) 3 mg tablet Take 3 mg by mouth daily. 10/1/22  Yes Provider, Historical   traZODone (DESYREL) 150 mg tablet Take 150 mg by mouth nightly. Yes Provider, Historical        Not on File     Past Medical History:   Diagnosis Date    Bipolar disorder ([facility] [address]     Diastolic blood pressure 90 mm Hg or higher     Elevated fasting glucose     Hypercholesterolemia     Obesity (BMI 30-39. 9)     Snoring         Family History   Family history unknown: Yes        History reviewed. No pertinent surgical history. Review of Systems   Constitutional:  Positive for fatigue. Negative for chills and fever. HENT:  Negative for congestion, ear pain, nosebleeds, sinus pain, sore throat and tinnitus. Eyes:  Negative for redness. Respiratory:  Negative for cough and shortness of breath. Cardiovascular:  Negative for chest pain and palpitations. Gastrointestinal:  Negative for abdominal pain, diarrhea, nausea and vomiting. Endocrine: Negative for cold intolerance and polyuria. Genitourinary:  Negative for dysuria and hematuria. Musculoskeletal:  Negative for back pain and neck pain. Skin:  Negative for rash. Neurological:  Positive for dizziness. Negative for headaches. Psychiatric/Behavioral: Negative. /78 (BP 1 Location: Left upper arm, BP Patient Position: Sitting)   Pulse 91   Temp 98.1 °F (36.7 °C) (Temporal)   Ht 6' 1\" (1.854 m)   Wt 254 lb (115.2 kg)   SpO2 97%   BMI 33.51 kg/m²      Physical Exam  Constitutional:       General: He is not in acute distress. Appearance: Normal appearance. He is obese. Comments: Patient is with his  Nabil PAULINO:      Head: Normocephalic and atraumatic. Right Ear: External ear normal.      Left Ear: External ear normal.      Nose: Nose normal.      Mouth/Throat:      Mouth: Mucous membranes are moist.   Eyes:      Extraocular Movements: Extraocular movements intact.       Pupils: Pupils are equal, round, and reactive to light. Cardiovascular:      Rate and Rhythm: Normal rate and regular rhythm. Pulmonary:      Effort: Pulmonary effort is normal.      Breath sounds: Normal breath sounds. Abdominal:      General: Bowel sounds are normal.      Palpations: Abdomen is soft. Tenderness: There is no abdominal tenderness. Musculoskeletal:         General: Normal range of motion. Cervical back: Neck supple. Skin:     General: Skin is warm and dry. Neurological:      General: No focal deficit present. Mental Status: He is alert and oriented to person, place, and time. Psychiatric:         Mood and Affect: Mood normal.       ASSESSMENT/PLAN:  Below is the assessment and plan developed based on review of pertinent history, physical exam, labs, studies, and medications. 1. Uncontrolled type 2 diabetes mellitus with hyperglycemia ([facility] [address]  Comments:  Labs reviewed with  patient and  Nabil,  Glucose 457 BUN 8, creatinine 1.16, sodium 136, potassium 4.2. Diagnosis of diabetes discussed with the patient and . Strict diabetic diet advised, will start Janumet advised to take 1 Janumet daily for 2 weeks and then 1 twice a day. We will start Jardiance. Advised to check glucose at home prescription for a compa sensor sent to the pharmacy. Arrange for diabetic education with the dietitian and advised eye exam.  Orders:  -     REFERRAL TO OPHTHALMOLOGY  -     REFERRAL TO DIETITIAN  -     flash glucose sensor (FreeStyle Compa 14 Day Sensor) kit; 1 Each by Does Not Apply route Once every 2 weeks. , Normal, Disp-2 Kit, R-4  -     flash glucose scanning reader (FreeStyle Compa 14 Day [place]) misc; 1 Each by Does Not Apply route Once every 2 weeks. , Normal, Disp-2 Each, R-4  -     SITagliptin-metFORMIN (JANUMET) 50-1,000 mg per tablet; 1 tab daily for 2 weeks and then 1 tab twice a day, Normal, Disp-166 Tablet, R-1  -     empagliflozin (JARDIANCE) 10 mg tablet;  Take 1 Tablet by mouth daily., Normal, Disp-90 Tablet, R-1  -     HEMOGLOBIN A1C WITH EAG  -     MICROALBUMIN, UR, RAND W/ MICROALB/CREAT RATIO  2. Mixed hyperlipidemia  Comments: Total cholesterol 199, triglyceride 352, HDL 31, . Advised low-fat low-carb diet we will start fenofibrate. Orders:  -     fenofibrate nanocrystallized (TRICOR) 145 mg tablet; Take 1 Tablet by mouth daily. , Normal, Disp-90 Tablet, R-1  -     LIPID PANEL  -     METABOLIC PANEL, COMPREHENSIVE  3. Obesity (BMI 30-39. 9)  Comments:  Advised healthy diet, to avoid soft drinks including diet soft drinks juice and juices, daily exercise and weight loss. 4. Diastolic blood pressure 90 mm Hg or higher  Comments:  Blood pressure is controlled, advised to continue metoprolol    Return in about 3 months (around 2/16/2023), or if symptoms worsen or fail to improve. There are no Patient Instructions on file for this visit. Health Maintenance Due   Topic Date Due    Hepatitis C Screening  Never done    COVID-19 Vaccine (1) Never done    Foot Exam Q1  Never done    MICROALBUMIN Q1  Never done    Eye Exam Retinal or Dilated  Never done    DTaP/Tdap/Td series (1 - Tdap) Never done    Medicare Yearly Exam  Never done    Flu Vaccine (1) Never done        Aspects of this note may have been generated using voice recognition software. Despite editing, there may be unrecognized errors. An electronic signature was used to authenticate this note.   -- You Lebron MD

## 2022-11-22 ENCOUNTER — TELEPHONE (OUTPATIENT)
Dept: INTERNAL MEDICINE CLINIC | Age: 37
End: 2022-11-22

## 2022-11-22 DIAGNOSIS — E11.65 UNCONTROLLED TYPE 2 DIABETES MELLITUS WITH HYPERGLYCEMIA (HCC): Primary | ICD-10-CM

## 2022-11-22 RX ORDER — INSULIN PUMP SYRINGE, 3 ML
EACH MISCELLANEOUS
Qty: 1 KIT | Refills: 0 | Status: SHIPPED | OUTPATIENT
Start: 2022-11-22

## 2022-11-22 RX ORDER — CALCIUM CITRATE/VITAMIN D3 200MG-6.25
TABLET ORAL
Qty: 100 STRIP | Refills: 3 | Status: SHIPPED | OUTPATIENT
Start: 2022-11-22

## 2022-11-22 RX ORDER — LANCETS
EACH MISCELLANEOUS
Qty: 100 EACH | Refills: 5 | Status: SHIPPED | OUTPATIENT
Start: 2022-11-22

## 2022-11-22 NOTE — TELEPHONE ENCOUNTER
Pharmacy states [employer] is not covered. [employer] is covered by insurance. Needs order for it.

## 2023-01-16 ENCOUNTER — HOSPITAL ENCOUNTER (INPATIENT)
Age: 38
LOS: 6 days | Discharge: HOME | DRG: 885 | End: 2023-01-23
Attending: STUDENT IN AN ORGANIZED HEALTH CARE EDUCATION/TRAINING PROGRAM | Admitting: PSYCHIATRY & NEUROLOGY
Payer: MEDICARE

## 2023-01-16 DIAGNOSIS — E78.2 MIXED HYPERLIPIDEMIA: ICD-10-CM

## 2023-01-16 DIAGNOSIS — E11.65 UNCONTROLLED TYPE 2 DIABETES MELLITUS WITH HYPERGLYCEMIA (HCC): ICD-10-CM

## 2023-01-16 DIAGNOSIS — F31.9 BIPOLAR AFFECTIVE DISORDER, REMISSION STATUS UNSPECIFIED (HCC): Primary | ICD-10-CM

## 2023-01-16 LAB
ALBUMIN SERPL-MCNC: 4.4 G/DL (ref 3.5–5)
ALBUMIN/GLOB SERPL: 1.2 (ref 1.1–2.2)
ALP SERPL-CCNC: 54 U/L (ref 45–117)
ALT SERPL-CCNC: 34 U/L (ref 12–78)
ANION GAP SERPL CALC-SCNC: 7 MMOL/L (ref 5–15)
APPEARANCE UR: CLEAR
AST SERPL W P-5'-P-CCNC: 23 U/L (ref 15–37)
BACTERIA URNS QL MICRO: NEGATIVE /HPF
BASOPHILS # BLD: 0 K/UL (ref 0–0.1)
BASOPHILS NFR BLD: 1 % (ref 0–1)
BILIRUB SERPL-MCNC: 0.4 MG/DL (ref 0.2–1)
BILIRUB UR QL: NEGATIVE
BUN SERPL-MCNC: 24 MG/DL (ref 6–20)
BUN/CREAT SERPL: 18 (ref 12–20)
CA-I BLD-MCNC: 9.5 MG/DL (ref 8.5–10.1)
CHLORIDE SERPL-SCNC: 106 MMOL/L (ref 97–108)
CO2 SERPL-SCNC: 26 MMOL/L (ref 21–32)
COLOR UR: ABNORMAL
CREAT SERPL-MCNC: 1.32 MG/DL (ref 0.7–1.3)
DIFFERENTIAL METHOD BLD: NORMAL
EOSINOPHIL # BLD: 0.1 K/UL (ref 0–0.4)
EOSINOPHIL NFR BLD: 2 % (ref 0–7)
EPITH CASTS URNS QL MICRO: ABNORMAL /LPF
ERYTHROCYTE [DISTWIDTH] IN BLOOD BY AUTOMATED COUNT: 13.5 % (ref 11.5–14.5)
FLUAV RNA SPEC QL NAA+PROBE: NOT DETECTED
FLUBV RNA SPEC QL NAA+PROBE: NOT DETECTED
GLOBULIN SER CALC-MCNC: 3.7 G/DL (ref 2–4)
GLUCOSE SERPL-MCNC: 100 MG/DL (ref 65–100)
GLUCOSE UR STRIP.AUTO-MCNC: >1000 MG/DL
HCT VFR BLD AUTO: 46.4 % (ref 36.6–50.3)
HGB BLD-MCNC: 15 G/DL (ref 12.1–17)
HGB UR QL STRIP: NEGATIVE
IMM GRANULOCYTES # BLD AUTO: 0 K/UL (ref 0–0.04)
IMM GRANULOCYTES NFR BLD AUTO: 0 % (ref 0–0.5)
KETONES UR QL STRIP.AUTO: NEGATIVE MG/DL
LEUKOCYTE ESTERASE UR QL STRIP.AUTO: NEGATIVE
LYMPHOCYTES # BLD: 1.7 K/UL (ref 0.8–3.5)
LYMPHOCYTES NFR BLD: 32 % (ref 12–49)
MCH RBC QN AUTO: 27.9 PG (ref 26–34)
MCHC RBC AUTO-ENTMCNC: 32.3 G/DL (ref 30–36.5)
MCV RBC AUTO: 86.4 FL (ref 80–99)
MONOCYTES # BLD: 0.4 K/UL (ref 0–1)
MONOCYTES NFR BLD: 7 % (ref 5–13)
MUCOUS THREADS URNS QL MICRO: ABNORMAL /LPF
NEUTS SEG # BLD: 3.1 K/UL (ref 1.8–8)
NEUTS SEG NFR BLD: 58 % (ref 32–75)
NITRITE UR QL STRIP.AUTO: NEGATIVE
NRBC # BLD: 0 K/UL (ref 0–0.01)
NRBC BLD-RTO: 0 PER 100 WBC
PH UR STRIP: 5
PLATELET # BLD AUTO: 175 K/UL (ref 150–400)
PMV BLD AUTO: 10.7 FL (ref 8.9–12.9)
POTASSIUM SERPL-SCNC: 3.7 MMOL/L (ref 3.5–5.1)
PROT SERPL-MCNC: 8.1 G/DL (ref 6.4–8.2)
PROT UR STRIP-MCNC: NEGATIVE MG/DL
RBC # BLD AUTO: 5.37 M/UL (ref 4.1–5.7)
RBC #/AREA URNS HPF: ABNORMAL /HPF (ref 0–5)
SARS-COV-2, COV2: NOT DETECTED
SODIUM SERPL-SCNC: 139 MMOL/L (ref 136–145)
SP GR UR REFRACTOMETRY: 1.02 (ref 1–1.03)
UA: UC IF INDICATED,UAUC: ABNORMAL
UROBILINOGEN UR QL STRIP.AUTO: 0.1 EU/DL (ref 0.2–1)
WBC # BLD AUTO: 5.3 K/UL (ref 4.1–11.1)
WBC URNS QL MICRO: ABNORMAL /HPF (ref 0–4)

## 2023-01-16 PROCEDURE — 87636 SARSCOV2 & INF A&B AMP PRB: CPT

## 2023-01-16 PROCEDURE — 81001 URINALYSIS AUTO W/SCOPE: CPT

## 2023-01-16 PROCEDURE — 80053 COMPREHEN METABOLIC PANEL: CPT

## 2023-01-16 PROCEDURE — 36415 COLL VENOUS BLD VENIPUNCTURE: CPT

## 2023-01-16 PROCEDURE — 80307 DRUG TEST PRSMV CHEM ANLYZR: CPT

## 2023-01-16 PROCEDURE — 85025 COMPLETE CBC W/AUTO DIFF WBC: CPT

## 2023-01-16 PROCEDURE — 82077 ASSAY SPEC XCP UR&BREATH IA: CPT

## 2023-01-16 PROCEDURE — 99285 EMERGENCY DEPT VISIT HI MDM: CPT

## 2023-01-17 PROBLEM — F25.9 SCHIZOAFFECTIVE DISORDER (HCC): Status: ACTIVE | Noted: 2023-01-17

## 2023-01-17 LAB
AMPHET UR QL SCN: NEGATIVE
BARBITURATES UR QL SCN: NEGATIVE
BENZODIAZ UR QL: NEGATIVE
CANNABINOIDS UR QL SCN: NEGATIVE
COCAINE UR QL SCN: NEGATIVE
DRUG SCRN COMMENT,DRGCM: NORMAL
ETHANOL SERPL-MCNC: <10 MG/DL
GLUCOSE BLD STRIP.AUTO-MCNC: 121 MG/DL (ref 65–100)
GLUCOSE BLD STRIP.AUTO-MCNC: 78 MG/DL (ref 65–100)
METHADONE UR QL: NEGATIVE
OPIATES UR QL: NEGATIVE
PCP UR QL: NEGATIVE
PERFORMED BY, TECHID: ABNORMAL
PERFORMED BY, TECHID: NORMAL

## 2023-01-17 PROCEDURE — 74011250637 HC RX REV CODE- 250/637: Performed by: PSYCHIATRY & NEUROLOGY

## 2023-01-17 PROCEDURE — 65220000003 HC RM SEMIPRIVATE PSYCH

## 2023-01-17 PROCEDURE — 96372 THER/PROPH/DIAG INJ SC/IM: CPT

## 2023-01-17 PROCEDURE — 74011250636 HC RX REV CODE- 250/636: Performed by: STUDENT IN AN ORGANIZED HEALTH CARE EDUCATION/TRAINING PROGRAM

## 2023-01-17 PROCEDURE — 82962 GLUCOSE BLOOD TEST: CPT

## 2023-01-17 RX ORDER — ACETAMINOPHEN 325 MG/1
650 TABLET ORAL
Status: DISCONTINUED | OUTPATIENT
Start: 2023-01-17 | End: 2023-01-23 | Stop reason: HOSPADM

## 2023-01-17 RX ORDER — ZIPRASIDONE MESYLATE 20 MG/ML
20 INJECTION, POWDER, LYOPHILIZED, FOR SOLUTION INTRAMUSCULAR
Status: COMPLETED | OUTPATIENT
Start: 2023-01-17 | End: 2023-01-17

## 2023-01-17 RX ORDER — TRAZODONE HYDROCHLORIDE 150 MG/1
150 TABLET ORAL
Status: DISCONTINUED | OUTPATIENT
Start: 2023-01-17 | End: 2023-01-23 | Stop reason: HOSPADM

## 2023-01-17 RX ORDER — LORAZEPAM 1 MG/1
2 TABLET ORAL
Status: DISCONTINUED | OUTPATIENT
Start: 2023-01-17 | End: 2023-01-23 | Stop reason: HOSPADM

## 2023-01-17 RX ORDER — TRAZODONE HYDROCHLORIDE 50 MG/1
50 TABLET ORAL
Status: DISCONTINUED | OUTPATIENT
Start: 2023-01-17 | End: 2023-01-17

## 2023-01-17 RX ORDER — IBUPROFEN 200 MG
4 TABLET ORAL AS NEEDED
Status: DISCONTINUED | OUTPATIENT
Start: 2023-01-17 | End: 2023-01-23 | Stop reason: HOSPADM

## 2023-01-17 RX ORDER — HYDROXYZINE PAMOATE 50 MG/1
50 CAPSULE ORAL
Status: DISCONTINUED | OUTPATIENT
Start: 2023-01-17 | End: 2023-01-23 | Stop reason: HOSPADM

## 2023-01-17 RX ORDER — INSULIN LISPRO 100 [IU]/ML
INJECTION, SOLUTION INTRAVENOUS; SUBCUTANEOUS
Status: DISCONTINUED | OUTPATIENT
Start: 2023-01-17 | End: 2023-01-23 | Stop reason: HOSPADM

## 2023-01-17 RX ORDER — ZIPRASIDONE HYDROCHLORIDE 20 MG/1
20 CAPSULE ORAL
Status: DISCONTINUED | OUTPATIENT
Start: 2023-01-17 | End: 2023-01-17 | Stop reason: SDUPTHER

## 2023-01-17 RX ORDER — DEXTROSE MONOHYDRATE 100 MG/ML
0-250 INJECTION, SOLUTION INTRAVENOUS AS NEEDED
Status: DISCONTINUED | OUTPATIENT
Start: 2023-01-17 | End: 2023-01-23 | Stop reason: HOSPADM

## 2023-01-17 RX ORDER — METOPROLOL SUCCINATE 25 MG/1
12.5 TABLET, EXTENDED RELEASE ORAL
Status: DISCONTINUED | OUTPATIENT
Start: 2023-01-17 | End: 2023-01-23 | Stop reason: HOSPADM

## 2023-01-17 RX ORDER — ZIPRASIDONE HYDROCHLORIDE 20 MG/1
20 CAPSULE ORAL
Status: DISCONTINUED | OUTPATIENT
Start: 2023-01-17 | End: 2023-01-23 | Stop reason: HOSPADM

## 2023-01-17 RX ORDER — LORAZEPAM 2 MG/ML
2 INJECTION INTRAMUSCULAR
Status: DISCONTINUED | OUTPATIENT
Start: 2023-01-17 | End: 2023-01-23 | Stop reason: HOSPADM

## 2023-01-17 RX ORDER — MAG HYDROX/ALUMINUM HYD/SIMETH 200-200-20
30 SUSPENSION, ORAL (FINAL DOSE FORM) ORAL
Status: DISCONTINUED | OUTPATIENT
Start: 2023-01-17 | End: 2023-01-23 | Stop reason: HOSPADM

## 2023-01-17 RX ORDER — RISPERIDONE 1 MG/1
1 TABLET, FILM COATED ORAL DAILY
Status: DISCONTINUED | OUTPATIENT
Start: 2023-01-18 | End: 2023-01-23 | Stop reason: HOSPADM

## 2023-01-17 RX ORDER — ATORVASTATIN CALCIUM 10 MG/1
10 TABLET, FILM COATED ORAL
Status: DISCONTINUED | OUTPATIENT
Start: 2023-01-17 | End: 2023-01-23 | Stop reason: HOSPADM

## 2023-01-17 RX ORDER — ZIPRASIDONE HYDROCHLORIDE 20 MG/1
20 CAPSULE ORAL AS NEEDED
Status: DISCONTINUED | OUTPATIENT
Start: 2023-01-17 | End: 2023-01-17

## 2023-01-17 RX ORDER — QUETIAPINE FUMARATE 100 MG/1
100 TABLET, FILM COATED ORAL 3 TIMES DAILY
Status: DISCONTINUED | OUTPATIENT
Start: 2023-01-17 | End: 2023-01-23 | Stop reason: HOSPADM

## 2023-01-17 RX ADMIN — RISPERIDONE 3 MG: 2 TABLET ORAL at 22:06

## 2023-01-17 RX ADMIN — ATORVASTATIN CALCIUM 10 MG: 10 TABLET, FILM COATED ORAL at 21:11

## 2023-01-17 RX ADMIN — TRAZODONE HYDROCHLORIDE 150 MG: 150 TABLET ORAL at 21:11

## 2023-01-17 RX ADMIN — METOPROLOL SUCCINATE 12.5 MG: 25 TABLET, EXTENDED RELEASE ORAL at 21:10

## 2023-01-17 RX ADMIN — ZIPRASIDONE MESYLATE 20 MG: 20 INJECTION, POWDER, LYOPHILIZED, FOR SOLUTION INTRAMUSCULAR at 00:32

## 2023-01-17 RX ADMIN — LORAZEPAM 2 MG: 1 TABLET ORAL at 22:06

## 2023-01-17 RX ADMIN — QUETIAPINE FUMARATE 100 MG: 100 TABLET ORAL at 21:11

## 2023-01-17 RX ADMIN — HYDROXYZINE PAMOATE 50 MG: 50 CAPSULE ORAL at 13:37

## 2023-01-17 NOTE — ED TRIAGE NOTES
Patient brought in by [facility] under a ECO. Patient stays at a group home [facility] and the  took out an ECO saying that he is schizophrenic and bipolar and for the past two days he has not been participating in his usual daily activities and is acting acutely psychotic. SH wrote that she attempted to get him to come to the ED voluntarily but he would not so she had to take out an ECO. She also wrote that he is not eating, drinking or sleeping. During triage patient is talking and singing under his breath.

## 2023-01-17 NOTE — BSMART NOTE
Patient brought in under ECO by LUCIANA Mejia from [facility] and will assess when REACH is available to be on zoom call as well.

## 2023-01-17 NOTE — ED NOTES
TRANSFER - OUT REPORT:    Verbal report given to patrick(name) on Sanjuana Selby  being transferred to (unit) for routine progression of care       Report consisted of patients Situation, Background, Assessment and   Recommendations(SBAR). Information from the following report(s) SBAR and ED Summary was reviewed with the receiving nurse. Lines:       Opportunity for questions and clarification was provided.       Patient transported with:   [employer]

## 2023-01-17 NOTE — GROUP NOTE
IP  GROUP DOCUMENTATION INDIVIDUAL                                                                          Group Therapy Note    Date: 1/17/2023    Group Start Time: 2091  Group End Time: 0071  Group Topic: Recreational/Music Therapy    SRM 2  NON ACUTE    Rochelle Duran    IP [address] GROUP DOCUMENTATION GROUP    Group Therapy Note    Facilitated leisure skills group to reinforce positive coping and to manage mood through music, social interaction, group activities and art task    Attendees: 1/7       Attendance: Did not attend    Additional Notes:  Encouraged but did not attend    Joseluis Alcala, [address]

## 2023-01-17 NOTE — GROUP NOTE
[facility]  GROUP DOCUMENTATION INDIVIDUAL                                                                          Group Therapy Note    Date: 1/17/2023    Group Start Time: 3659  Group End Time: 1400  Group Topic: Process Group - Inpatient    SRM 2 BEHA TH ACUTE    Radha Simons    IP [address] [facility] GROUP DOCUMENTATION GROUP    Group Therapy Note  Process group was focused on self-care and how to increase their mental health. The group was small with only one pt and she was provided a worksheet on \"steps in taking control of our mental health\". Pt interacted well with the writer and was able to process what she would like to work on.    Attendees: 1-7       Attendance: Did not attend          Additional Notes:  Pt was encouraged to attend and chose not to     [facility]

## 2023-01-17 NOTE — BSMART NOTE
Patient currently being assessed by Leticia Betts from [address] [place] [address] Road from [facility] via [place]. [facility] remains at bedside.

## 2023-01-17 NOTE — BH NOTES
Client is being admitted to [facility] under the professional services of Gurpreet Belle. Client is alert and oriented x 3. Client is distracted and unable to focus. He has thought blocking and says the same thing over and over again. Adamantly denies the need to be here. Verbalizes that he was sitting in his room not bothering anybody and staff started trying to force him to come to the hospital.Client reports that he is not having any behavioral problems. Reports eating and drinking . Takes meds as prescribed and denies any side effects. Based on information from [facility],patient has not been taking care of basic adls. Client has not been eating or drinking and refusing medication. He has been locking himself in his room. He lives in [facility]. He has a diagnosis of schizophrenia and  has a mild intellectual disability. Client told me there is a lot of evil in the world and it is going to end soon. Reports he serves the [employer] and God doesnot like what he sees. Verbalizes that he is staying in his room where he can be protected from the evil that is in here. He is paranoid and guarded. Reports that he is fearful of what is going to happen. He has been pacing around his room nonstop. Lunch tray placed at bedside but client has not touched it. Client observed talking loudly as if carrying on a conversation with someone. While talking to client he was responding to the voices and trying to answer my questions as well. Unable to sign consents due to inability to focus. Client will be placed on close observation for safety.

## 2023-01-17 NOTE — ED NOTES
PT. MADE COMFORTABLE AT THIS TIME AND PROVIDED SHEETS AND BLANKETS. PT. RESTING COMFORTABLY AT THIS TIME.

## 2023-01-17 NOTE — ED PROVIDER NOTES
[facility] EMERGENCY DEPT  EMERGENCY DEPARTMENT HISTORY AND PHYSICAL EXAM      Date: 1/16/2023  Patient Name: Art Breen  MRN: [id number]  YOB: 1985  Date of evaluation: 1/16/2023  Provider: Te Lackey MD   Note Started: 10:12 PM 1/16/23    HISTORY OF PRESENT ILLNESS     Chief Complaint   Patient presents with    Mental Health Problem       History Provided By: Patient and EMS    HPI: Art Breen, 40 y.o. male with past medical history as reviewed below presents for evaluation and care of [facility] police under an E CO. Patient resides at the [facility] [employer] [facility] home and manager to get an E CO today due to patient being less interactive and concern for increased auditory hallucinations. Patient denies any complaints, states that his auditory hallucinations are at baseline and that they are not command based. He denies any suicidal or homicidal ideation. He denies any medical complaints. Denies any alcohol or other drug ingestion. He states he is compliant with his medications. PAST MEDICAL HISTORY   Past Medical History:  Past Medical History:   Diagnosis Date    Bipolar disorder ([address]     Diastolic blood pressure 90 mm Hg or higher     Elevated fasting glucose     Hypercholesterolemia     Obesity (BMI 30-39. 9)     Snoring        Past Surgical History:  History reviewed. No pertinent surgical history. Family History:  Family History   Family history unknown: Yes       Social History:  Social History     Tobacco Use    Smoking status: Never    Smokeless tobacco: Never   Vaping Use    Vaping Use: Never used   Substance Use Topics    Alcohol use: Never    Drug use: Never       Allergies:  No Known Allergies    PCP: Sonali Taylor MD    Current Meds:   Previous Medications    ACETAMINOPHEN (TYLENOL) 325 MG TABLET    Take 325 mg by mouth every six (6) hours as needed for Pain. ATORVASTATIN (LIPITOR) 10 MG TABLET    Take 10 mg by mouth nightly.     BLOOD-GLUCOSE METER (TRUE METRIX GLUCOSE METER) MONITORING KIT    Check blood sugar twice a day    EMPAGLIFLOZIN (JARDIANCE) 10 MG TABLET    Take 1 Tablet by mouth daily. FENOFIBRATE NANOCRYSTALLIZED (TRICOR) 145 MG TABLET    Take 1 Tablet by mouth daily. FLASH GLUCOSE SCANNING READER (FREESTYLE SAMI 14 DAY READER) MISC    1 Each by Does Not Apply route Once every 2 weeks. FLASH GLUCOSE SENSOR (FREESTYLE SAMI 14 DAY SENSOR) KIT    1 Each by Does Not Apply route Once every 2 weeks. GLUCOSE BLOOD VI TEST STRIPS (TRUE METRIX GLUCOSE TEST STRIP) STRIP    Check blood sugar twice a day    LANCETS MISC    Check blood sugar twice a day    METOPROLOL SUCCINATE (TOPROL-XL) 25 MG XL TABLET    TAKE 1/2 TABLET BY MOUTH AT BEDTIME    RISPERIDONE (RISPERDAL) 1 MG TABLET    Take 1 mg by mouth daily. RISPERIDONE (RISPERDAL) 3 MG TABLET    Take 3 mg by mouth daily. SITAGLIPTIN-METFORMIN (JANUMET) 50-1,000 MG PER TABLET    1 tab daily for 2 weeks and then 1 tab twice a day    TRAZODONE (DESYREL) 150 MG TABLET    Take 150 mg by mouth nightly. REVIEW OF SYSTEMS   Review of Systems  Positives and Pertinent negatives as per HPI. PHYSICAL EXAM     ED Triage Vitals [01/16/23 2124]   ED Encounter Vitals Group      BP       Pulse       Resp       Temp       Temp src       SpO2       Weight 250 lb      Height 6'      Physical Exam    SCREENINGS               No data recorded        LAB, EKG AND DIAGNOSTIC RESULTS   Labs:  No results found for this or any previous visit (from the past 12 hour(s)). Radiologic Studies:  Non-plain film images such as CT, Ultrasound and MRI are read by the radiologist. Plain radiographic images are visualized and preliminarily interpreted by the ED Provider with the below findings:        Interpretation per the Radiologist below, if available at the time of this note:  No results found. PROCEDURES   Unless otherwise noted below, none.   Performed by: Herbie Light MD   Procedures      CRITICAL CARE TIME ED COURSE and DIFFERENTIAL DIAGNOSIS/MDM   Vitals:    Vitals:    01/16/23 2124   Weight: 113.4 kg (250 lb)   Height: 6' (1.829 m)        Patient was given the following medications:  Medications - No data to display    CONSULTS: (Who and What was discussed)  None     CC/HPI Summary, DDx, ED Course, and Reassessment:   Presents for evaluation under an E CO due to concern for acute exacerbation of his underlying psychiatric disorders. Patient is well-appearing on exam, denying suicidal or homicidal ideation at this time. Will have him evaluated by behavioral health service to gain collateral from his group home and determine if he meets criteria for acute inpatient crisis psych hospitalization. We will perform medical screening labs. ED Course as of 01/19/23 Catalina Villagran Jan 16, 2023   4407 Medically clear [BQ]      ED Course User Index  [BQ] Elissa Antonio MD           FINAL IMPRESSION   No diagnosis found. DISPOSITION/PLAN   Behavioral Health Hold    Admit Note: Pt is being admitted by [facility]. The results of their tests and reason(s) for their admission have been discussed with pt and/or available family. They convey agreement and understanding for the need to be admitted and for the admission diagnosis. I am the Primary Clinician of Record: Augustine Pinon MD (electronically signed)    (Please note that parts of this dictation were completed with voice recognition software. Quite often unanticipated grammatical, syntax, homophones, and other interpretive errors are inadvertently transcribed by the computer software. Please disregards these errors.  Please excuse any errors that have escaped final proofreading.)

## 2023-01-18 LAB
GLUCOSE BLD STRIP.AUTO-MCNC: 89 MG/DL (ref 65–100)
GLUCOSE BLD STRIP.AUTO-MCNC: 90 MG/DL (ref 65–100)
GLUCOSE BLD STRIP.AUTO-MCNC: 92 MG/DL (ref 65–100)
PERFORMED BY, TECHID: NORMAL

## 2023-01-18 PROCEDURE — 74011250637 HC RX REV CODE- 250/637: Performed by: PSYCHIATRY & NEUROLOGY

## 2023-01-18 PROCEDURE — 65220000003 HC RM SEMIPRIVATE PSYCH

## 2023-01-18 PROCEDURE — 82962 GLUCOSE BLOOD TEST: CPT

## 2023-01-18 RX ADMIN — HYDROXYZINE PAMOATE 50 MG: 50 CAPSULE ORAL at 08:39

## 2023-01-18 RX ADMIN — HYDROXYZINE PAMOATE 50 MG: 50 CAPSULE ORAL at 21:49

## 2023-01-18 RX ADMIN — RISPERIDONE 3 MG: 2 TABLET ORAL at 21:49

## 2023-01-18 RX ADMIN — TRAZODONE HYDROCHLORIDE 150 MG: 150 TABLET ORAL at 21:50

## 2023-01-18 RX ADMIN — QUETIAPINE FUMARATE 100 MG: 100 TABLET ORAL at 16:33

## 2023-01-18 RX ADMIN — QUETIAPINE FUMARATE 100 MG: 100 TABLET ORAL at 21:49

## 2023-01-18 RX ADMIN — LORAZEPAM 2 MG: 1 TABLET ORAL at 21:49

## 2023-01-18 RX ADMIN — METOPROLOL SUCCINATE 12.5 MG: 25 TABLET, EXTENDED RELEASE ORAL at 21:49

## 2023-01-18 RX ADMIN — RISPERIDONE 1 MG: 1 TABLET ORAL at 08:39

## 2023-01-18 RX ADMIN — QUETIAPINE FUMARATE 100 MG: 100 TABLET ORAL at 08:39

## 2023-01-18 RX ADMIN — ATORVASTATIN CALCIUM 10 MG: 10 TABLET, FILM COATED ORAL at 21:49

## 2023-01-18 NOTE — BH NOTES
After hearing, when pt was being given lunch tray, pt became loud and angry. Took his tray to his room and threw it across the room. Pt had not eaten any of his meal. BS was 89. Pt verbalized being angry that his house supervisor \"set him up and said he was not taking his medications and was not in his right mind. \"  Pt said he had to get mad and throw his tray because he was set up. Pt redirected verbally, verbalized understanding that throwing his food for any reason is not acceptable bx and will only prolong his stay. Pt verbally agreed to seek writer/staff when feeling angry or frustrated about not being discharged. Denies SI/HI and has been medication compliant.

## 2023-01-18 NOTE — PROGRESS NOTES
Patient actively participated in Spirituality Group discussed frustrations and letting things go, in the [facility] [facility] unit.  utilized music, lyrics to favorite songs, words of encouragement and affirmation, and scripture to facilitate the group discussion and enhance group dynamics. Rev.  Simeon Gallegos [address], [address]

## 2023-01-18 NOTE — H&P
[facility] HISTORY AND PHYSICAL    Name:  Bartolome Echavarria  MR#:  [id number]  :  1985  ACCOUNT #:  [de-identified]  ADMIT DATE:  2023    This is a 61-year-old single  male patient  admitted to behavioral health unit under. The patient says he has tried to kill himself  he was not himself, talking to himself, . Apparently, the patient denies . was seeing Dr. Gayle Ribeiro and getting trazodone 150 mg at night and risperidone 1 mg in the morning and 3 mg at night and Seroquel XR . He is also taking medications for cholesterol and blood pressure. The patient is a poor historian, paranoid. ALLERGIES TO MEDICATIONS:  NO KNOWN ALLERGIES. TRAUMA HISTORY:  None. FAMILY HISTORY:  None. LABORATORIES:  COVID-19 and influenza A and B not detrug screen negative. Alcohol level 10. Metabolic panel:  Creatinine  BUN 20, otherwise unremarkable. Liver functions unremarkable. WBC unremarkable. Urinalysis unremarkable. VITAL SIGNS:  Temperature 98, pulse 76, blood pressure 126/respirations , O2 of 100% on room air. Height 6 feet, weight 113.4 kg. MEDICATIONS:  1. Atorvastatin   2. Insulin Humalog our times a day   3. Metoprolol tartrate 12.5 mg at bedtime. 4.  Risperidone 1 mg in the morning and 3 mg at night. 5.  Trazodone 150 mg at night. MENTAL STATUS EXAM:  Tall, heavy-set male patient, clean, polite, but guarded, paranoid, suspicious, hallucinating, talking to himself, auditory hallucination, no command hallucination, suspicious. Probably some intellectual limitation. Insight poor. Judgment is poor    DIAGNOSES:  Schizophrenia, paranoid type, with acute exacerbation. Hypertension. Diabetes type 2. Hyperlipidemia. DISPOSITION:  The patient needs inpatient level of care, close observation. Medications reviewed and reconciled. We will get a medical consult. Needs individual therapy, group therapy, medication management.   Stabilize and return for outpatient treatment with Dr. Mariya Conrad. Return back to the group home. LENGTH OF STAY:  5-7 days.       Luda Barbosa MD      [id number]  D:  01/18/2023 0:53  T:  01/18/2023 5:28  JOB #:  [id number]

## 2023-01-18 NOTE — PROGRESS NOTES
Problem: Psychosis  Goal: *STG: Decreased hallucinations  Outcome: Progressing Towards Goal     Problem: Psychosis  Goal: *STG: Decreased delusional thinking  Outcome: Progressing Towards Goal     Problem: Psychosis  Goal: *STG: Seeks staff when feelings of self harm or harm towards others arise  Outcome: Progressing Towards Goal     Problem: Psychosis  Goal: *STG: Patient will verbalize areas in need of boundary recognition and limit setting  Outcome: Progressing Towards Goal     Problem: Psychosis  Goal: *STG: Patient will develop strategies to regulate emotions and corresponding behaviors  Outcome: Progressing Towards Goal     Problem: Psychosis  Goal: *STG: Participates in individual and group therapy  Outcome: Progressing Towards Goal     Problem: Psychosis  Goal: *STG/LTG: Complies with medication therapy  Outcome: Progressing Towards Goal

## 2023-01-18 NOTE — BH NOTES
PSA PART II ADDITIONAL INFORMATION        Access To Fire Arms: No    Substance Use: NO    Last Use:     Type of Substance: no substance use    Frequency of Use:     Request to See : YES    If yes, notified : YES    Release of Information Signed: YES    Release of Information Signed For: Karen  Aida Richard: NO    Patient reviewed and signed treatment plan

## 2023-01-18 NOTE — GROUP NOTE
[facility]  GROUP DOCUMENTATION INDIVIDUAL                                                                          Group Therapy Note    Date: 1/18/2023    Group Start Time: 1400  Group End Time: 1430  Group Topic: Process Group - Inpatient    [facility] CARE MANAGEMENT    Jodi Rivera BSW    [facility] GROUP DOCUMENTATION GROUP    Group Therapy Note    The writer dicussed how to decastrophize thoughts and how to use reasoning to break down anxious thoughts. Attendees: 4/8       Attendance: Attended    Patient's Goal:  Attend Group     Interventions/techniques: Supported    Follows Directions: Followed directions    Interactions: Disorganized interaction    Mental Status: Anxious, Hallucinations, Preoccupied, and Restricted    Behavior/appearance: Cooperative, Disheveled, and Needed prompting    Goals Achieved: Able to listen to others      Additional Notes: The patient discussed his frustration with peers at his group home, but was able to identify the people in that setting that are strong supports.      ROSAS CopeW

## 2023-01-18 NOTE — BH NOTES
, Miko Mayfield called to check on patient. Her number is [phone] and would like to know the outcome of the hearing today.

## 2023-01-18 NOTE — CONSULTS
Consult    Subjective:     Patient is a 40y.o. year old male with a PMH of bipolar, HTN, HLD and DM type 2 who presented to the ED for an evaluation by the [facility] police under an E CO. Patient resides at the [facility]. The manager of the facility had to get an E CO on 1/16/2023 due to the patient being less interactive and the manager had concern for increased AH. Patient reports he is experiencing AH and VH, but states these are the same hallucinations he typically experiences and they are not commanding of him to harm himself or others. He states he typically keeps to himself at the group [facility] and is complaint with his medications, but his roommate has been causing him and others issues at the group [facility], which the  is \"blaming him for\". He denies any SI or HI. Also, he denies any tobacco, alcohol or drug use. He denies any chest pain, shortness of breath, abdominal pain, nausea, vomiting, or any other symptoms at this time. Past Medical History:   Diagnosis Date    Bipolar disorder ([address]     Diastolic blood pressure 90 mm Hg or higher     Elevated fasting glucose     Hypercholesterolemia     Obesity (BMI 30-39. 9)     Snoring       History reviewed. No pertinent surgical history.   Family History   Family history unknown: Yes      Social History     Tobacco Use    Smoking status: Never    Smokeless tobacco: Never   Substance Use Topics    Alcohol use: Never       Current Facility-Administered Medications   Medication Dose Route Frequency Provider Last Rate Last Admin    acetaminophen (TYLENOL) tablet 650 mg  650 mg Oral Q4H PRN Elkin Hernandez MD        alum-mag hydroxide-simeth (MYLANTA) oral suspension 30 mL  30 mL Oral Q4H PRN Elkin Hernandez MD        hydrOXYzine pamoate (VISTARIL) capsule 50 mg  50 mg Oral TID PRN Trevor FELIX MD   50 mg at 01/18/23 5244    QUEtiapine (SEROquel) tablet 100 mg  100 mg Oral TID Laura Clark MD   100 mg at 01/18/23 2367 ziprasidone (GEODON) 20 mg in sterile water (preservative free) 1 mL injection  20 mg IntraMUSCular Q12H PRN Alessandra Jennings MD        ziprasidone (GEODON) capsule 20 mg  20 mg Oral Q12H PRN Alessandra Jennings MD        traZODone (DESYREL) tablet 150 mg  150 mg Oral QHS PRN Alessandra Jennings MD   150 mg at 01/17/23 2111    LORazepam (ATIVAN) tablet 2 mg  2 mg Oral Q6H PRN Alessandra Jennings MD   2 mg at 01/17/23 2206    LORazepam (ATIVAN) injection 2 mg  2 mg IntraMUSCular Q6H PRN Alessandra Jennings MD        ziprasidone (GEODON) capsule 20 mg  20 mg Oral Q12H PRN Alessandra Jennings MD        insulin lispro (HUMALOG) injection   SubCUTAneous AC&HS Alessandra Jennings MD        glucose chewable tablet 16 g  4 Tablet Oral PRN Elkin Hernandez MD        glucagon (GLUCAGEN) injection 1 mg  1 mg IntraMUSCular PRN Alessandra Jennings MD        dextrose 10% infusion 0-250 mL  0-250 mL IntraVENous PRN Alessandra Jennings MD        risperiDONE (RisperDAL) tablet 1 mg  1 mg Oral DAILY Elkin Hernandez MD   1 mg at 01/18/23 0839    risperiDONE (RisperDAL) tablet 3 mg  3 mg Oral QHS Elkin Hernandez MD   3 mg at 01/17/23 2206    metoprolol succinate (TOPROL-XL) XL tablet 12.5 mg  12.5 mg Oral QHS Lauri Hernandez MD   12.5 mg at 01/17/23 2110    atorvastatin (LIPITOR) tablet 10 mg  10 mg Oral QHS Erin FELIX MD   10 mg at 01/17/23 2111        No Known Allergies     Review of Systems:  Constitutional: Negative for chills and fever. HENT: Negative. Eyes: Negative. Respiratory: Negative. Cardiovascular: Negative. Gastrointestinal: Negative for abdominal pain and nausea. Skin: Negative. Neurological: Negative. Psychiatric: Positive for AH and VH. Negative for SI and HI    Objective: Intake and Output:    No intake/output data recorded. No intake/output data recorded. Physical Exam:   Constitutional: pt is oriented to person, place, and time. HENT:   Head: Normocephalic and atraumatic.    Eyes: Pupils are equal, round, and reactive to light. EOM are normal.   Cardiovascular: Normal rate, regular rhythm and normal heart sounds. Pulmonary/Chest: Breath sounds normal. No wheezes. No rales. Exhibits no tenderness. Abdominal: Soft. Bowel sounds are normal. There is no abdominal tenderness. There is no rebound and no guarding. Musculoskeletal: Normal range of motion. Neurological: pt is alert and oriented to person, place, and time. Alert. Normal strength. No cranial nerve deficit or sensory deficit. Displays a negative Romberg sign.       Data Review:   Recent Results (from the past 24 hour(s))   GLUCOSE, POC    Collection Time: 01/17/23  7:52 PM   Result Value Ref Range    Glucose (POC) 121 (H) 65 - 100 mg/dL    Performed by Luz Abreu, POC    Collection Time: 01/18/23  8:06 AM   Result Value Ref Range    Glucose (POC) 90 65 - 100 mg/dL    Performed by [address] [place] [address], POC    Collection Time: 01/18/23 11:17 AM   Result Value Ref Range    Glucose (POC) 89 65 - 100 mg/dL    Performed by Radha Huber        No orders to display        Assessment/Plan:     Schizophrenia, paranoid type, with acute exacerbation  -  Continue current medication prescribed by the psychiatrist  HTN  - continue metoprolol  DM type 2  - insulin sliding scale  HLD  - continue lipitor  Acute kidney injury likely dehydration        Continue current medication as prescribed by the psychiatrist  Will repeat BMP to monitor renal function

## 2023-01-18 NOTE — BH NOTES
PSYCHOSOCIAL ASSESSMENT  :Patient identifying info:   Alan Fisher is a 40 y.o., male admitted 1/16/2023  9:20 PM     Presenting problem and precipitating factors: The patient was brought under and ECO due to lack of sleep, refusing medication, responding to internal stimuli, and Yazdanism preoccupation. Mental status assessment: The patient denied SI/HI/AVH at this time and was oriented x3, he lacks insight into his mental health and hospitalization at this time and is moderately ID. The patient has been responding to internal stimuli and fixates on being in the hospital forever following his TDO. The writer explained the expectations the hospital has for him and the process of treatment. But he remains fixated on being     Strengths: being humble     Collateral information: The writer got an override from Dr. Alejandro Sanchez to speak with Mrs. Elder Caldwell     Current psychiatric /substance abuse providers and contact info: The patient has a therapist named Kam Diaz he has seen for 2.5 yeats     Previous psychiatric/substance abuse providers and response to treatment: The patient has a history of hospitalization     Family history of mental illness or substance abuse: Patient denied     Substance abuse history:  Patient denied   Social History     Tobacco Use    Smoking status: Never    Smokeless tobacco: Never   Substance Use Topics    Alcohol use: Never       History of biomedical complications associated with substance abuse : Patient denied     Patient's current acceptance of treatment or motivation for change: \" to go home\"     Family constellation: The patient has family in [address] [place] [address]     Is significant other involved? The patient is single     Describe support system: \"Mrs. Talbot\"     Describe living arrangements and home environment: [facility] [address] issues:  high blood pressure and type 2 diabetes   Hospital Problems  Date Reviewed: 11/16/2022            Codes Class Noted POA    Schizoaffective disorder [facility]) ICD-10-CM: F25.9  ICD-9-CM: 295.70  2023 Unknown           Trauma history: The patient reported there has been trauma     Legal issues: Patient denied     History of [place] service: Patient denied     Financial status: Disability     Congregation/cultural factors: Patient denied     Education/work history: HS Diploma     Have you been licensed as a health care professional (current or ): Patient denied     Leisure and recreation preferences: watching tv, reading, playing football     Describe coping skills: watch tv, reading, playing football     Tushar Aguilera  2023

## 2023-01-18 NOTE — PROGRESS NOTES
Problem: Psychosis  Goal: *STG: Remains safe in hospital  Outcome: Progressing Towards Goal     Problem: *Psychosis: Discharge Outcome  Goal: *Absent or Controlled Active Psychotic Symptoms  Outcome: Not Progressing Towards Goal     Patient has been safe so far this shift. Patient continues have active psychotic symptoms. Patient remains on close observation. He has been pacing the unit the majority of the shift. He has continuously been talking to himself. The physician was called and new orders were entered. Accuchek was in the 120's so no sliding scale insulin was needed. At 2111, patient was started on Lipitor, Troprol XL, Seroquel 100 MG, and Trazodone 150 MG. Patient continued to pace the unit, touch and talk to a 1:1 staff person, talk to self, not going to bed, hallucinating, telling staff that they scare him with weird eyes, knocking on unit office doors, and trying to open up locked door. At 2206, he was given Risperdal 3 MG and Ativan PO  2 MG. Patient continues to pace the hallway. He was asked to not touch the female  1:1 staff. At 22:42, he is still pacing.

## 2023-01-19 LAB
ANION GAP SERPL CALC-SCNC: 6 MMOL/L (ref 5–15)
BUN SERPL-MCNC: 11 MG/DL (ref 6–20)
BUN/CREAT SERPL: 10 (ref 12–20)
CA-I BLD-MCNC: 8.8 MG/DL (ref 8.5–10.1)
CHLORIDE SERPL-SCNC: 107 MMOL/L (ref 97–108)
CO2 SERPL-SCNC: 27 MMOL/L (ref 21–32)
CREAT SERPL-MCNC: 1.06 MG/DL (ref 0.7–1.3)
GLUCOSE BLD STRIP.AUTO-MCNC: 109 MG/DL (ref 65–100)
GLUCOSE BLD STRIP.AUTO-MCNC: 109 MG/DL (ref 65–100)
GLUCOSE BLD STRIP.AUTO-MCNC: 116 MG/DL (ref 65–100)
GLUCOSE BLD STRIP.AUTO-MCNC: 132 MG/DL (ref 65–100)
GLUCOSE SERPL-MCNC: 117 MG/DL (ref 65–100)
PERFORMED BY, TECHID: ABNORMAL
POTASSIUM SERPL-SCNC: 3.6 MMOL/L (ref 3.5–5.1)
SODIUM SERPL-SCNC: 140 MMOL/L (ref 136–145)

## 2023-01-19 PROCEDURE — 80048 BASIC METABOLIC PNL TOTAL CA: CPT

## 2023-01-19 PROCEDURE — 36415 COLL VENOUS BLD VENIPUNCTURE: CPT

## 2023-01-19 PROCEDURE — 82962 GLUCOSE BLOOD TEST: CPT

## 2023-01-19 PROCEDURE — 65220000003 HC RM SEMIPRIVATE PSYCH

## 2023-01-19 PROCEDURE — 74011250637 HC RX REV CODE- 250/637: Performed by: PSYCHIATRY & NEUROLOGY

## 2023-01-19 RX ADMIN — RISPERIDONE 3 MG: 2 TABLET ORAL at 21:04

## 2023-01-19 RX ADMIN — LORAZEPAM 2 MG: 1 TABLET ORAL at 12:41

## 2023-01-19 RX ADMIN — HYDROXYZINE PAMOATE 50 MG: 50 CAPSULE ORAL at 08:12

## 2023-01-19 RX ADMIN — QUETIAPINE FUMARATE 100 MG: 100 TABLET ORAL at 16:00

## 2023-01-19 RX ADMIN — TRAZODONE HYDROCHLORIDE 150 MG: 150 TABLET ORAL at 21:04

## 2023-01-19 RX ADMIN — ATORVASTATIN CALCIUM 10 MG: 10 TABLET, FILM COATED ORAL at 21:04

## 2023-01-19 RX ADMIN — ZIPRASIDONE HYDROCHLORIDE 20 MG: 20 CAPSULE ORAL at 19:39

## 2023-01-19 RX ADMIN — METOPROLOL SUCCINATE 12.5 MG: 25 TABLET, EXTENDED RELEASE ORAL at 21:04

## 2023-01-19 RX ADMIN — QUETIAPINE FUMARATE 100 MG: 100 TABLET ORAL at 08:01

## 2023-01-19 RX ADMIN — QUETIAPINE FUMARATE 100 MG: 100 TABLET ORAL at 21:04

## 2023-01-19 RX ADMIN — RISPERIDONE 1 MG: 1 TABLET ORAL at 08:07

## 2023-01-19 NOTE — GROUP NOTE
IP  GROUP DOCUMENTATION INDIVIDUAL                                                                          Group Therapy Note    Date: 1/19/2023    Group Start Time: 4048  Group End Time: 1630  Group Topic: Recreational/Music Therapy    [facility] 2  NON ACUTE    Reilly Sicks    IP [facility] GROUP DOCUMENTATION GROUP    Group Therapy Note    Facilitated leisure skills group to reinforce positive coping and to manage mood through music, social interaction, group activities and art task    Attendees: 4/7       Attendance: Attended    Patient's Goal:  Attend group daily     Interventions/techniques: Art integration and Supported    Follows Directions: Followed directions    Interactions: Interacted appropriately    Mental Status: Calm    Behavior/appearance: Cooperative and Needed prompting    Goals Achieved: Able to engage in interactions and Able to listen to others      Additional Notes:  Receptive to listening to music while working on leisure task. Interacted with staff when prompted. Left group.  Did not return    Myrna Nath, [address]

## 2023-01-19 NOTE — GROUP NOTE
IP  GROUP DOCUMENTATION INDIVIDUAL                                                                          Group Therapy Note    Date: 1/18/2023    Group Start Time: 9384  Group End Time: 1915  Group Topic: Recreational/Music Therapy    SRM 2  NON ACUTE    [facility]    IP [facility] GROUP DOCUMENTATION GROUP    Group Therapy Note    Attendees: 0/8    Facilitated structured leisure skills group to introduce healthy leisure skills as positive way to cope and manage mood.         Attendance: Did not attend    Additional Notes:  Did not attend despite encouragement    Ming Ramirez, CTRS

## 2023-01-19 NOTE — BH NOTES
Patient remained in bed at the beginning of the shift. After receiving his bedtime medication he paced the hallway for about an hour then went to lye down. He got up again about an hour after that to ask if the doctor was coming tonight. Then he paced the hallway for about another hour with obvious auditory stimuli. Carry on a full conversation as he walked then he finally went to his room and got in the bed. Resting quietly with his eyes closed as of now  No acute distress noted at this time. Remains on q 15 minutes close observation. Will continue to monitor.

## 2023-01-19 NOTE — PROGRESS NOTES
Yes mama                                Progress Note  Date:2023       Room:[facility]  Patient Name:Jose A Justice     YOB: 1985     Age:37 y.o. Subjective    Subjective   Review of Systems  Objective         Vitals Last 24 Hours:  TEMPERATURE:  Temp  Av.2 °F (36.8 °C)  Min: 98 °F (36.7 °C)  Max: 98.3 °F (36.8 °C)  RESPIRATIONS RANGE: Resp  Av.5  Min: 17  Max: 18  PULSE OXIMETRY RANGE: No data recorded  PULSE RANGE: Pulse  Av  Min: 88  Max: 92  BLOOD PRESSURE RANGE: Systolic (25KTN), SPQ:333 , Min:132 , OCE:147   ; Diastolic (64ARL), QHZ:05, Min:85, Max:87    I/O (24Hr): No intake or output data in the 24 hours ending 23  Objective  Labs/Imaging/Diagnostics    Labs:  CBC:  Recent Labs     23   WBC 5.3   RBC 5.37   HGB 15.0   HCT 46.4   MCV 86.4   RDW 13.5        CHEMISTRIES:  Recent Labs     23      K 3.7      CO2 26   BUN 24*   CA 9.5   PT/INR:No results for input(s): INR, INREXT in the last 72 hours. No lab exists for component: PROTIME  APTT:No results for input(s): APTT in the last 72 hours. LIVER PROFILE:  Recent Labs     23   AST 23   ALT 34     Lab Results   Component Value Date/Time    ALT (SGPT) 34 2023 10:43 PM    AST (SGOT) 23 2023 10:43 PM    Alk. phosphatase 54 2023 10:43 PM    Bilirubin, total 0.4 2023 10:43 PM       Imaging Last 24 Hours:  No results found. Assessment//Plan   Active Problems:    Schizoaffective disorder ([facility] [address] (2023)      Assessment & Plan patient case discussed in the treatment team patient seen for follow-up remains angry irritable experiencing inner stimuli paranoid guarded. Got more agitated after the commitment hearing through the food tray on the floor making. Staff counseled him. About his inappropriate behavior how it can delay him getting up.   Complying with the medications no side effects vital signs are normal blood sugar 89 continued inpatient level of care indicated thank you    Electronically signed by Brenton Phelan MD on 1/18/2023 at 10:44 PM

## 2023-01-19 NOTE — BH NOTES
Behavioral Health Treatment Team Note     Patient goal(s) for today: \"beat my charges\"   Treatment team focus/goals: Attend group and continue medication management     Progress note: The patient presented in the hallway and appeared to be in need of grooming, he described his mood as \"great\" and presented a flat mood with an incongruent affect. He denied SI/HI/AVH at this time and was oriented x3, he kept his responses minimal with the writer and maintained little to no eye contact. Continuing to speak to himself and remaining internally preoccupied with little to no insight into his hospitalization. Collateral Call    The writer spoke with Jonne Phalen form the patient's group home and offered an update. She asked the writer if there was a projected discharge date and was told that she would be informed when one was given by the attending physician. Daphnie Garcia then asked what his medication list was and if there were any recent changes and the writer transferred her to the nursing station to have those questions answered.       LOS:  2  Expected LOS: 5-7 Days     Insurance info/prescription coverage:  Medicare   Date of last family contact:  None Made   Family requesting physician contact today:  no  Discharge plan:  Medication Stabilization   Guns in the home:  no   Outpatient provider(s):  Jorge L Dutton     Participating treatment team members: Cesar Espinosa Current BSW

## 2023-01-19 NOTE — BH NOTES
Client is paranoid and guarded. Preoccupied with inner thoughts . Client observed pacing back and forth on the unit talking to himself. Admits to hearing voices but would not say what the voices say to him. Untidy and not taking care od adls. Accucheck was 132 this am and 116 for lunch. Client pacing in the brooks responding to the voices and said somebody was going to get hurt. Client given ativan 2mg to help reduce anxiety. Client remains on close observation for safety.

## 2023-01-19 NOTE — GROUP NOTE
IP  GROUP DOCUMENTATION INDIVIDUAL                                                                          Group Therapy Note    Date: 1/19/2023    Group Start Time: 1120  Group End Time: 1140  Group Topic: Education Group - Inpatient    SRM 2 BH NON ACUTE    Osman Braxton    [facility] GROUP DOCUMENTATION GROUP    Group Therapy Note    Writer attempted to provide group focus on \" identifying different barriers that has prevented progress and identifying ways to confront them\" Despite encouragement 0/8 attended    Attendees: 0/8       Attendance: Did not attend    Additional Notes:  Encouraged but did not attend. Met with individually and provided education handout.  Also accepted a journal    Danyelle Castelan, [address]

## 2023-01-19 NOTE — PROGRESS NOTES
Problem: Psychosis  Goal: *STG: Decreased hallucinations  Outcome: Progressing Towards Goal  Goal: *STG: Decreased delusional thinking  Outcome: Progressing Towards Goal  Goal: *STG: Remains safe in hospital  Outcome: Progressing Towards Goal  Goal: *STG: Seeks staff when feelings of self harm or harm towards others arise  Outcome: Progressing Towards Goal  Goal: *STG: Patient will verbalize areas in need of boundary recognition and limit setting  Outcome: Progressing Towards Goal  Goal: *STG: Patient will develop strategies to regulate emotions and corresponding behaviors  Outcome: Progressing Towards Goal  Goal: *STG: Accept constructive criticism without injury or isolation  Outcome: Progressing Towards Goal  Goal: *STG: Participates in individual and group therapy  Outcome: Progressing Towards Goal  Goal: *STG: Develop safety plan for times when triggered in stressful situations  Outcome: Progressing Towards Goal  Goal: *STG: Patient will verbalize issues that get in their way of progress (i.e.: anger, fear etc.)  Outcome: Progressing Towards Goal  Goal: *STG/LTG: Complies with medication therapy  Outcome: Progressing Towards Goal  Goal: *STG/LTG: Demonstrates improved thought patterns as evidenced by logical and coherent speech  Outcome: Progressing Towards Goal  Goal: *STG/LTG: Demonstrates improved social functioning by responding appropriately to staff  Outcome: Progressing Towards Goal  Goal: Interventions  Outcome: Progressing Towards Goal     Problem: *Psychosis: Discharge Outcome  Goal: *Absent or Controlled Active Psychotic Symptoms  Outcome: Progressing Towards Goal     Problem: Falls - Risk of  Goal: *Absence of Falls  Description: Document Reuben Suresh Fall Risk and appropriate interventions in the flowsheet.   Outcome: Progressing Towards Goal  Note: Fall Risk Interventions:                                Problem: Patient Education: Go to Patient Education Activity  Goal: Patient/Family Education  Outcome: Progressing Towards Goal

## 2023-01-19 NOTE — GROUP NOTE
[facility] GROUP DOCUMENTATION INDIVIDUAL                                                                          Group Therapy Note    Date: 1/19/2023    Group Start Time: 2440  Group End Time: 1330  Group Topic: Process Group - Inpatient    SRM 2 BEHA HLTH ACUTE    Rizzo Nina    IP [facility] [facility] GROUP DOCUMENTATION GROUP    Group Therapy Note  Writer tried to encourage pts to join group and pts denied. Writer passed out informational sheets on how to cope when under stress.    Attendees: 0-8       Attendance: Did not attend    Additional Notes:  Pt was encouraged to attend and chose not to     [facility]

## 2023-01-20 LAB
FLUAV RNA SPEC QL NAA+PROBE: NOT DETECTED
FLUBV RNA SPEC QL NAA+PROBE: NOT DETECTED
GLUCOSE BLD STRIP.AUTO-MCNC: 118 MG/DL (ref 65–100)
GLUCOSE BLD STRIP.AUTO-MCNC: 86 MG/DL (ref 65–100)
PERFORMED BY, TECHID: ABNORMAL
PERFORMED BY, TECHID: NORMAL
SARS-COV-2, COV2: NOT DETECTED

## 2023-01-20 PROCEDURE — 65220000003 HC RM SEMIPRIVATE PSYCH

## 2023-01-20 PROCEDURE — 82962 GLUCOSE BLOOD TEST: CPT

## 2023-01-20 PROCEDURE — 87636 SARSCOV2 & INF A&B AMP PRB: CPT

## 2023-01-20 PROCEDURE — 74011250637 HC RX REV CODE- 250/637: Performed by: PSYCHIATRY & NEUROLOGY

## 2023-01-20 RX ADMIN — ATORVASTATIN CALCIUM 10 MG: 10 TABLET, FILM COATED ORAL at 21:10

## 2023-01-20 RX ADMIN — RISPERIDONE 1 MG: 1 TABLET ORAL at 08:14

## 2023-01-20 RX ADMIN — METOPROLOL SUCCINATE 12.5 MG: 25 TABLET, EXTENDED RELEASE ORAL at 21:09

## 2023-01-20 RX ADMIN — QUETIAPINE FUMARATE 100 MG: 100 TABLET ORAL at 08:14

## 2023-01-20 RX ADMIN — RISPERIDONE 3 MG: 2 TABLET ORAL at 21:10

## 2023-01-20 RX ADMIN — QUETIAPINE FUMARATE 100 MG: 100 TABLET ORAL at 16:22

## 2023-01-20 RX ADMIN — QUETIAPINE FUMARATE 100 MG: 100 TABLET ORAL at 21:09

## 2023-01-20 NOTE — GROUP NOTE
IP  GROUP DOCUMENTATION INDIVIDUAL                                                                          Group Therapy Note    Date: 1/20/2023    Group Start Time: 3910  Group End Time: 1806  Group Topic: Recreational/Music Therapy    SRM 2  NON ACUTE    Junius Papa    IP [facility] [facility] GROUP DOCUMENTATION GROUP    Group Therapy Note    Facilitated leisure skills group to reinforce positive coping and to manage mood through music, social interaction, group activities and art task    Attendees: 4/8       Attendance: Did not attend    Additional Notes:  Encouraged but did not attend    Tay Naranjo, [address]

## 2023-01-20 NOTE — PROGRESS NOTES
Problem: Psychosis  Goal: *STG: Decreased hallucinations    Note: Affect restricted. Cont to pace the hallway, talking to himself. Cont to talk about the staff, @ the adult home, and that he did not do anything to be admitted. Repetitive speech. Problem: Psychosis  Goal: *STG/LTG: Complies with medication therapy    Note: Compliant with scheduled meds. Cooperative with allowing BS to be monitored; 8a 86 mg/dl, 12n 118 mg/dl. Problem: Psychosis  Goal: *STG: Participates in individual and group therapy    Note: Encouraged to attend groups; however, cont to pace the hallway. .      Problem: Psychosis  Goal: *STG: Remains safe in hospital    Note: Q 15 mins checks maintained, for safety.

## 2023-01-20 NOTE — GROUP NOTE
IP  GROUP DOCUMENTATION INDIVIDUAL                                                                          Group Therapy Note    Date: 1/20/2023    Group Start Time: 5119  Group End Time: 1146  Group Topic: Education Group - Inpatient    SRM 2  NON ACUTE    Damir Roth    IP [address] [facility] GROUP DOCUMENTATION GROUP    Group Therapy Note    Setting Goals/Facilitated discussion focused on stepping up to a better you by taking steps to improve in their well-being and identifying goals that would help to ensure follow-up    Attendees: 1/9       Attendance: Did not attend    Additional Notes:  Encouraged but did not attend    OMAR Fowler

## 2023-01-20 NOTE — PROGRESS NOTES
Problem: Psychosis  Goal: *STG: Remains safe in hospital  Outcome: Progressing Towards Goal     Problem: Psychosis  Goal: *STG/LTG: Complies with medication therapy  Outcome: Progressing Towards Goal     Patient has been safe so far this shift. Patient was medication compliant. Patient remains on close observation. Patient has been alert, oriented, cooperative and pleasant. Patient has been pacing the hallways. He constantly talks to himself. He denied any including depression, SI or HI. He did ask for and receive PO Geodon PRN for anxiety. He slept well. He is presently up on the unit pacing the hallway. He made a point to tell staff that he feels as if he is ready to be discharged. Medication compliant. Continue to assess. Since going to bed, patient has been laying down quietly with his eyes closed.

## 2023-01-20 NOTE — BH NOTES
Writer was walking with pt in the hallway and asked if he hears voices when he is talking to himself. Pt said that sometimes but not all the time. He said that he has always talked to himself and that it is how he thinks about things. Pt reported that the voices are never \"bad\" and they do not tell him commands.

## 2023-01-20 NOTE — BH NOTES
Behavioral Health Treatment Team Note     Patient goal(s) for today: \"to control my anger\"  Treatment team focus/goals: continue medication management, group therapy and provide a safe discharge plan     Progress note: Pt presented with a flat affect and irritable affect. Pt was observed pacing the halls, speaking to himself. He denied any SI/HI/AVH but appeared to be internally pre-occupied answering himself. When writer spoke to pt about the discharge plan and pt paused, looked away, said \"yes\". Then pt looked at [address] and said Gris Lang that is good\". It is difficult to assess if pt is endorsing AH or talking to himself. Writer spoke with Ms. Tricia Skelton and she said that they have also been questioning if he is hearing voices. She informed writer the pt does not receive an injection and is med compliant. Ms. Tricia Skelton was notified that the anticipated discharge date in Monday.  An inpatient level of care is needed to monitor pts mood/AH and coordinate a safe discharge    LOS:  3  Expected LOS: 5-7 days    Insurance info/prescription coverage:  Medicare  Date of last family contact:  1.20.23  Family requesting physician contact today:  no  Discharge plan:  Medication stabilization   Guns in the home:  no   Outpatient provider(s):  Chyna Patel     Participating treatment team members: Kalyan Rothman, * (assigned SW), Fernanda Del Castillo LMSW

## 2023-01-20 NOTE — BH NOTES
PSYCHIATRIC PROGRESS NOTE         Patient Name  Sandy Dominguez   Date of Birth 1985   [facility] [id number]   Medical Record Number  [id number]      Age  40 y.o. PCP Robert Dowling MD   Admit date:  1/16/2023    Room Number  237/02  @ [address] [facility]   Date of Service  1/19/2023            HISTORY OF PRESENT ILLNESS/INTERVAL HISTORY:    Sandy Dominguez is 40 y.o. UNAVAILABLE male with            Medical History:  Past Medical History:   Diagnosis Date    Bipolar disorder ([address]     Diastolic blood pressure 90 mm Hg or higher     Elevated fasting glucose     Hypercholesterolemia     Obesity (BMI 30-39. 9)     Snoring      History reviewed. No pertinent surgical history. Past medical history from the initial psychiatric evaluation has been reviewed (reviewed/updated 1/19/2023) with no additional updates (I asked patient and no additional past medical history provided). Allergies: No Known Allergies    SH:   Social History     Tobacco Use    Smoking status: Never    Smokeless tobacco: Never   Substance Use Topics    Alcohol use: Never         Social history from the initial psychiatric evaluation has been reviewed (reviewed/updated 1/19/2023) with no additional updates             MENTAL STATUS EXAM & VITALS       General Presentation: appropriate and casually dressed, guarded  Orientation: Alert and Oriented x3   Vital Signs: See below. Vital Signs (BP, pulse, and temperature) are within normal limits if not listed below. Gait and Station: [employer]. No ataxia. Musculoskeletal system: No extrapyramidal symptoms (EPS), no abnormal muscular movements or tardive dyskinesia (TD). Muscle strength and tone are within normal limits. Language: no aphasia or dysarthria. Speech: soft. Thought processes: logical, normal rate of thoughts, fair abstract reasoning/computation. Thought associations. Loose associations.    Thought content: no hallucinations   Suicidal ideation: none  Homicidal ideations: none   Mood: normal, no signs of depression  Affect: normal, no impression of anxiety   Memory recent: fair   Memory remote: fair   Concentration/attention: Patient is focused and non-distractible   Fund of knowledge: average   Insight: fair   Reliability: fair patient case discussed in the team patient seen for follow-up he is out of his room pacing alert oriented preoccupied talking to himself reminded patient to take better care of self also not to get angry threat or be aggressive or agitated it is important that he manage his anger and his behavior facilitate his discharge he is cooperative and he is taking medications he understood he says he will comply poor insight poor judgment but passively cooperative  Judgment: fair          VITALS:     No data found.   Wt Readings from Last 3 Encounters:   01/16/23 113.4 kg (250 lb)   11/16/22 115.2 kg (254 lb)   10/18/22 117.7 kg (259 lb 8 oz)     Temp Readings from Last 3 Encounters:   01/18/23 98.3 °F (36.8 °C)   11/16/22 98.1 °F (36.7 °C) (Temporal)   10/18/22 97.9 °F (36.6 °C) (Temporal)     BP Readings from Last 3 Encounters:   01/18/23 132/85   11/16/22 118/78   10/18/22 122/80     Pulse Readings from Last 3 Encounters:   01/18/23 88   11/16/22 91   10/18/22 63            DATA     LABORATORY DATA:(reviewed/updated 1/19/2023)  Recent Results (from the past 24 hour(s))   GLUCOSE, POC    Collection Time: 01/19/23  7:58 AM   Result Value Ref Range    Glucose (POC) 132 (H) 65 - 100 mg/dL    Performed by Monica Peters    METABOLIC PANEL, BASIC    Collection Time: 01/19/23 11:18 AM   Result Value Ref Range    Sodium 140 136 - 145 mmol/L    Potassium 3.6 3.5 - 5.1 mmol/L    Chloride 107 97 - 108 mmol/L    CO2 27 21 - 32 mmol/L    Anion gap 6 5 - 15 mmol/L    Glucose 117 (H) 65 - 100 mg/dL    BUN 11 6 - 20 mg/dL    Creatinine 1.06 0.70 - 1.30 mg/dL    BUN/Creatinine ratio 10 (L) 12 - 20      eGFR >60 >60 ml/min/1.73m2    Calcium 8.8 8.5 - 10.1 mg/dL   GLUCOSE, POC Collection Time: 01/19/23 11:43 AM   Result Value Ref Range    Glucose (POC) 116 (H) 65 - 100 mg/dL    Performed by Bonilla Ramirez, POC    Collection Time: 01/19/23  4:22 PM   Result Value Ref Range    Glucose (POC) 109 (H) 65 - 100 mg/dL    Performed by Bonilla Ramirez, POC    Collection Time: 01/19/23  7:46 PM   Result Value Ref Range    Glucose (POC) 109 (H) 65 - 100 mg/dL    Performed by Georgina Krueger      No results found for: VALF2, VALAC, VALP, VALPR, DS6, CRBAM, CRBAMP, CARB2, XCRBAM  No results found for: LITHM   RADIOLOGY REPORTS:(reviewed/updated 1/19/2023)  No results found.        MEDICATIONS     ALL MEDICATIONS:   Current Facility-Administered Medications   Medication Dose Route Frequency    acetaminophen (TYLENOL) tablet 650 mg  650 mg Oral Q4H PRN    alum-mag hydroxide-simeth (MYLANTA) oral suspension 30 mL  30 mL Oral Q4H PRN    hydrOXYzine pamoate (VISTARIL) capsule 50 mg  50 mg Oral TID PRN    QUEtiapine (SEROquel) tablet 100 mg  100 mg Oral TID    ziprasidone (GEODON) 20 mg in sterile water (preservative free) 1 mL injection  20 mg IntraMUSCular Q12H PRN    ziprasidone (GEODON) capsule 20 mg  20 mg Oral Q12H PRN    traZODone (DESYREL) tablet 150 mg  150 mg Oral QHS PRN    LORazepam (ATIVAN) tablet 2 mg  2 mg Oral Q6H PRN    LORazepam (ATIVAN) injection 2 mg  2 mg IntraMUSCular Q6H PRN    ziprasidone (GEODON) capsule 20 mg  20 mg Oral Q12H PRN    insulin lispro (HUMALOG) injection   SubCUTAneous AC&HS    glucose chewable tablet 16 g  4 Tablet Oral PRN    glucagon (GLUCAGEN) injection 1 mg  1 mg IntraMUSCular PRN    dextrose 10% infusion 0-250 mL  0-250 mL IntraVENous PRN    risperiDONE (RisperDAL) tablet 1 mg  1 mg Oral DAILY    risperiDONE (RisperDAL) tablet 3 mg  3 mg Oral QHS    metoprolol succinate (TOPROL-XL) XL tablet 12.5 mg  12.5 mg Oral QHS    atorvastatin (LIPITOR) tablet 10 mg  10 mg Oral QHS      SCHEDULED MEDICATIONS:   Current Facility-Administered Medications   Medication Dose Route Frequency    QUEtiapine (SEROquel) tablet 100 mg  100 mg Oral TID    insulin lispro (HUMALOG) injection   SubCUTAneous AC&HS    risperiDONE (RisperDAL) tablet 1 mg  1 mg Oral DAILY    risperiDONE (RisperDAL) tablet 3 mg  3 mg Oral QHS    metoprolol succinate (TOPROL-XL) XL tablet 12.5 mg  12.5 mg Oral QHS    atorvastatin (LIPITOR) tablet 10 mg  10 mg Oral QHS          ASSESSMENT & PLAN     Continue close observation  Discussed meds  Provided support, psycho edu  Discussed with staff in the treatment team, the progress made in therapy, psychosocial needs and nursing concerns. The following regarding medications was addressed during rounds with patient: SEE ABOVE for medications  the risks and benefits of the proposed medication. The patient was given the opportunity to ask questions. Informed consent given to the use of the above medications. Will continue to adjust psychiatric and non-psychiatric medications (see above \"medication\" section and orders section for details) as deemed appropriate & based upon diagnoses and response to treatment. Reviewed blood tests/labs and diagnostic tests as deemed appropriate and review results as they become available (see orders for details and above listed lab/test results). Obtain psychiatric records from previous [facility] hospitals to further elucidate the nature of patient's psychopathology and review once available. Gather additional collateral information from friends, family and o/p treatment team to further elucidate the nature of patient's psychopathology and baselline level of psychiatric functioning.          I certify that this patient's inpatient psychiatric hospital services continue to be, required for treatment that could reasonably be expected to improve the patient's condition and that the patient continues to need, on a daily basis, active treatment furnished directly by or requiring the supervision of inpatient psychiatric facility personnel. In addition, the hospital records show that services furnished were intensive treatment services.     Signed By:   Agustín Gallego MD  1/19/2023

## 2023-01-21 LAB
GLUCOSE BLD STRIP.AUTO-MCNC: 105 MG/DL (ref 65–100)
GLUCOSE BLD STRIP.AUTO-MCNC: 113 MG/DL (ref 65–100)
GLUCOSE BLD STRIP.AUTO-MCNC: 91 MG/DL (ref 65–100)
PERFORMED BY, TECHID: ABNORMAL
PERFORMED BY, TECHID: ABNORMAL
PERFORMED BY, TECHID: NORMAL

## 2023-01-21 PROCEDURE — 74011250637 HC RX REV CODE- 250/637: Performed by: PSYCHIATRY & NEUROLOGY

## 2023-01-21 PROCEDURE — 65220000003 HC RM SEMIPRIVATE PSYCH

## 2023-01-21 PROCEDURE — 82962 GLUCOSE BLOOD TEST: CPT

## 2023-01-21 RX ADMIN — RISPERIDONE 1 MG: 1 TABLET ORAL at 08:04

## 2023-01-21 RX ADMIN — ATORVASTATIN CALCIUM 10 MG: 10 TABLET, FILM COATED ORAL at 21:08

## 2023-01-21 RX ADMIN — METOPROLOL SUCCINATE 12.5 MG: 25 TABLET, EXTENDED RELEASE ORAL at 21:08

## 2023-01-21 RX ADMIN — RISPERIDONE 3 MG: 2 TABLET ORAL at 21:08

## 2023-01-21 RX ADMIN — QUETIAPINE FUMARATE 100 MG: 100 TABLET ORAL at 08:04

## 2023-01-21 RX ADMIN — LORAZEPAM 2 MG: 1 TABLET ORAL at 22:45

## 2023-01-21 RX ADMIN — QUETIAPINE FUMARATE 100 MG: 100 TABLET ORAL at 16:09

## 2023-01-21 RX ADMIN — QUETIAPINE FUMARATE 100 MG: 100 TABLET ORAL at 21:08

## 2023-01-21 RX ADMIN — TRAZODONE HYDROCHLORIDE 150 MG: 150 TABLET ORAL at 22:45

## 2023-01-21 NOTE — PROGRESS NOTES
Problem: Psychosis  Goal: *STG: Decreased delusional thinking  Outcome: Progressing Towards Goal     Problem: Psychosis  Goal: *STG: Seeks staff when feelings of self harm or harm towards others arise  Outcome: Progressing Towards Goal     Problem: Psychosis  Goal: *STG: Patient will verbalize areas in need of boundary recognition and limit setting  Outcome: Progressing Towards Goal     Problem: Psychosis  Goal: *STG: Accept constructive criticism without injury or isolation  Outcome: Progressing Towards Goal     Problem: Psychosis  Goal: *STG: Participates in individual and group therapy  Outcome: Progressing Towards Goal

## 2023-01-21 NOTE — PROGRESS NOTES
Psychiatric Progress Note    Patient: Edward Solis MRN: [id number]  SSN: [id number]    YOB: 1985  Age: 40 y.o. Sex: male      Admit Date: 1/16/2023       Subjective:     Edward Solis  reports feeling  *** . Labile irritability     Denies SI/HI/AH/VH. No aggression or violence. Appropriately interactive and aware. Tolerating medications well. Eating well and sleeping well. Case reviewed with nursing staff and no significant issues or events reported in the last 24 hours. Staff has observed patient interacting on the unit and attending group. Objective:     Vitals:    01/19/23 2315 01/20/23 0746 01/20/23 1922 01/21/23 0723   BP: 132/85 (!) 133/98 121/81 123/82   Pulse: 88 89 (!) 103 94   Resp: 17 19 18 20   Temp: 98.3 °F (36.8 °C) 98.2 °F (36.8 °C) 97.7 °F (36.5 °C) 97.8 °F (36.6 °C)   SpO2:   100% 100%   Weight:       Height:            Mental Status Exam:     Patient is alert, oriented x4  Speech is coherent, moderate volume, no flight of ideas, no looseness of association. Appropriate dressed and groomed  No Active suicidal ideations, plan or intent.   No active homicidal ideations plan or intent  No command hallucinations  Thought Processes linear  No persecutory delusions  Not seen responding to any active internal stimuli  Mood depressed with congruent affect  Insight fair  Judgement fair    No signs of tardive dyskinesia or extrapyramidal symptoms    MEDICATIONS:  Current Facility-Administered Medications   Medication Dose Route Frequency    acetaminophen (TYLENOL) tablet 650 mg  650 mg Oral Q4H PRN    alum-mag hydroxide-simeth (MYLANTA) oral suspension 30 mL  30 mL Oral Q4H PRN    hydrOXYzine pamoate (VISTARIL) capsule 50 mg  50 mg Oral TID PRN    QUEtiapine (SEROquel) tablet 100 mg  100 mg Oral TID    ziprasidone (GEODON) 20 mg in sterile water (preservative free) 1 mL injection  20 mg IntraMUSCular Q12H PRN    ziprasidone (GEODON) capsule 20 mg  20 mg Oral Q12H PRN    traZODone (DESYREL) tablet 150 mg  150 mg Oral QHS PRN    LORazepam (ATIVAN) tablet 2 mg  2 mg Oral Q6H PRN    LORazepam (ATIVAN) injection 2 mg  2 mg IntraMUSCular Q6H PRN    ziprasidone (GEODON) capsule 20 mg  20 mg Oral Q12H PRN    insulin lispro (HUMALOG) injection   SubCUTAneous AC&HS    glucose chewable tablet 16 g  4 Tablet Oral PRN    glucagon (GLUCAGEN) injection 1 mg  1 mg IntraMUSCular PRN    dextrose 10% infusion 0-250 mL  0-250 mL IntraVENous PRN    risperiDONE (RisperDAL) tablet 1 mg  1 mg Oral DAILY    risperiDONE (RisperDAL) tablet 3 mg  3 mg Oral QHS    metoprolol succinate (TOPROL-XL) XL tablet 12.5 mg  12.5 mg Oral QHS    atorvastatin (LIPITOR) tablet 10 mg  10 mg Oral QHS        DISCUSSION:  Discussed risk and benefits of medication, provided an opportunity to answer any questions, reviewed discharge goals. Lab/Data Review:  Recent Results (from the past 24 hour(s))   GLUCOSE, POC    Collection Time: 01/20/23 11:33 AM   Result Value Ref Range    Glucose (POC) 118 (H) 65 - 100 mg/dL    Performed by Jaqueline JACOBSONID-19 WITH INFLUENZA A/B    Collection Time: 01/20/23  4:00 PM   Result Value Ref Range    SARS-CoV-2 by PCR Not Detected Not Detected      Influenza A by PCR Not Detected Not Detected      Influenza B by PCR Not Detected Not Detected             Assessment:     Active Problems:    Schizoaffective disorder ([facility] [address] (1/17/2023)        Plan:     Continue current plan of care excepted as noted. Continue to participate in the milieu of activities and work towards discharge goals. Contracts for safety and has no immediate requests    Disposition planning with social work with the goal of a transition to an outpatient level of care. Patient would benefit from ongoing care as they have not yet reached their discharge goals are psychotropic medication optimization.     Tentative discharge TBD     Signed By: Sagar Zurita, PhD, PA-C, Carolina Wong  [phone]    January 21, 2023

## 2023-01-21 NOTE — BH NOTES
Pt is alert and oriented x 3. Affect and mood are congruent. Pt denies SI/HI at this time. Continues to pace around the unit, responding to internal stimuli. Medication and email compliant. Voices desire and readiness for possible return to group home on Monday. No c/o physical discomfort noted. Continue to monitor on close observation for safety.

## 2023-01-21 NOTE — GROUP NOTE
MAR[facility]  GROUP DOCUMENTATION INDIVIDUAL                                                                          Group Therapy Note    Date: 1/20/2023    Group Start Time: 1050  Group End Time: 2314  Group Topic: Nursing    SRM 2  NON ACUTE    Peyton Ayers LPN IP  GROUP DOCUMENTATION GROUP    Group Therapy Note    Attendees:1/9       Attendance: Did not attend    Patient's Goal:        Interventions/techniques: Follows Directions:     Interactions:     Mental Status:     Behavior/appearance:     Goals Achieved: Additional Notes:  Pt.  Invited he did not attend    [employer], LPN

## 2023-01-21 NOTE — GROUP NOTE
IP  GROUP DOCUMENTATION INDIVIDUAL                                                                          Group Therapy Note    Date: 1/21/2023    Group Start Time: 6221  Group End Time: 1600  Group Topic: Recreational/Music Therapy    SRM 2  NON ACUTE    Viry Bañuelos    IP [facility] GROUP DOCUMENTATION GROUP    Group Therapy Note    Attendees: 0/7    Facilitated structured leisure skills group to introduce healthy leisure skills as positive way to cope and manage mood.         Attendance: Did not attend    Additional Notes:  Did not attend group despite group encouragement    Diamond Cheema, [address]

## 2023-01-21 NOTE — GROUP NOTE
IP  GROUP DOCUMENTATION INDIVIDUAL                                                                          Group Therapy Note    Date: 1/21/2023    Group Start Time: 1115  Group End Time: 1200  Group Topic: Education Group - Inpatient    SRM 2 BH NON ACUTE    Cleave Fiddler    IP [address] [facility] GROUP DOCUMENTATION GROUP    Group Therapy Note    Attendees: 0/7    Facilitated structured group to identify triggers, impact of triggers on mental health, and positive ways to manage triggers.         Attendance: Did not attend    Additional Notes:  Did not attend group despite encouragement    LUCIE LiuS

## 2023-01-21 NOTE — BH NOTES
Initial Psychiatric Evaluation         Patient Name  Art Breen   Date of Birth 1985   BOY [id number]   Medical Record Number  [id number]      Age  40 y.o. PCP Sonali Taylor MD   Admit date:  1/16/2023    Room Number  237/02  @ [address] [facility]   Date of Service  1/20/2023            REASON FOR HOSPITALIZATION:      CC:       HISTORY OF PRESENT ILLNESS/INTERVAL HISTORY:    Art Breen is 40 y.o. UNAVAILABLE male with          PAST PSYCHIATRIC HISTORY:  No past psychiatric history is documented      PAST TRAUMA HISTORY:  No past trauma history is documented      Medical History:  Past Medical History:   Diagnosis Date    Bipolar disorder ([address]     Diastolic blood pressure 90 mm Hg or higher     Elevated fasting glucose     Hypercholesterolemia     Obesity (BMI 30-39. 9)     Snoring      History reviewed. No pertinent surgical history. Past medical history from the initial psychiatric evaluation has been reviewed (reviewed/updated 1/20/2023) with no additional updates (I asked patient and no additional past medical history provided).      Allergies: No Known Allergies    SH:   Social History     Tobacco Use    Smoking status: Never    Smokeless tobacco: Never   Substance Use Topics    Alcohol use: Never         Social history from the initial psychiatric evaluation has been reviewed (reviewed/updated 1/20/2023) with no additional updates               MENTAL STATUS EXAM & VITALS            VITALS:     Patient Vitals for the past 24 hrs:   Temp Pulse Resp BP SpO2   01/20/23 1922 97.7 °F (36.5 °C) (!) 103 18 121/81 100 %   01/20/23 0746 98.2 °F (36.8 °C) 89 19 (!) 133/98 --   01/19/23 2315 98.3 °F (36.8 °C) 88 17 132/85 --     Wt Readings from Last 3 Encounters:   01/16/23 113.4 kg (250 lb)   11/16/22 115.2 kg (254 lb)   10/18/22 117.7 kg (259 lb 8 oz)     Temp Readings from Last 3 Encounters:   01/20/23 97.7 °F (36.5 °C)   11/16/22 98.1 °F (36.7 °C) (Temporal)   10/18/22 97.9 °F (36.6 °C) (Temporal)     BP Readings from Last 3 Encounters:   01/20/23 121/81   11/16/22 118/78   10/18/22 122/80     Pulse Readings from Last 3 Encounters:   01/20/23 (!) 103   11/16/22 91   10/18/22 63            DATA     LABORATORY DATA:(reviewed/updated 1/20/2023)  Recent Results (from the past 24 hour(s))   GLUCOSE, POC    Collection Time: 01/20/23  7:24 AM   Result Value Ref Range    Glucose (POC) 86 65 - 100 mg/dL    Performed by Claudene Friday    GLUCOSE, POC    Collection Time: 01/20/23 11:33 AM   Result Value Ref Range    Glucose (POC) 118 (H) 65 - 100 mg/dL    Performed by Claudene Friday    COVID-19 WITH INFLUENZA A/B    Collection Time: 01/20/23  4:00 PM   Result Value Ref Range    SARS-CoV-2 by PCR Not Detected Not Detected      Influenza A by PCR Not Detected Not Detected      Influenza B by PCR Not Detected Not Detected       No results found for: VALF2, VALAC, VALP, VALPR, DS6, CRBAM, CRBAMP, CARB2, XCRBAM  No results found for: LITHM   RADIOLOGY REPORTS:(reviewed/updated 1/20/2023)  No results found.        MEDICATIONS     ALL MEDICATIONS:   Current Facility-Administered Medications   Medication Dose Route Frequency    acetaminophen (TYLENOL) tablet 650 mg  650 mg Oral Q4H PRN    alum-mag hydroxide-simeth (MYLANTA) oral suspension 30 mL  30 mL Oral Q4H PRN    hydrOXYzine pamoate (VISTARIL) capsule 50 mg  50 mg Oral TID PRN    QUEtiapine (SEROquel) tablet 100 mg  100 mg Oral TID    ziprasidone (GEODON) 20 mg in sterile water (preservative free) 1 mL injection  20 mg IntraMUSCular Q12H PRN    ziprasidone (GEODON) capsule 20 mg  20 mg Oral Q12H PRN    traZODone (DESYREL) tablet 150 mg  150 mg Oral QHS PRN    LORazepam (ATIVAN) tablet 2 mg  2 mg Oral Q6H PRN    LORazepam (ATIVAN) injection 2 mg  2 mg IntraMUSCular Q6H PRN    ziprasidone (GEODON) capsule 20 mg  20 mg Oral Q12H PRN    insulin lispro (HUMALOG) injection   SubCUTAneous AC&HS    glucose chewable tablet 16 g  4 Tablet Oral PRN    glucagon (GLUCAGEN) injection 1 mg  1 mg IntraMUSCular PRN    dextrose 10% infusion 0-250 mL  0-250 mL IntraVENous PRN    risperiDONE (RisperDAL) tablet 1 mg  1 mg Oral DAILY    risperiDONE (RisperDAL) tablet 3 mg  3 mg Oral QHS    metoprolol succinate (TOPROL-XL) XL tablet 12.5 mg  12.5 mg Oral QHS    atorvastatin (LIPITOR) tablet 10 mg  10 mg Oral QHS      SCHEDULED MEDICATIONS:   Current Facility-Administered Medications   Medication Dose Route Frequency    QUEtiapine (SEROquel) tablet 100 mg  100 mg Oral TID    insulin lispro (HUMALOG) injection   SubCUTAneous AC&HS    risperiDONE (RisperDAL) tablet 1 mg  1 mg Oral DAILY    risperiDONE (RisperDAL) tablet 3 mg  3 mg Oral QHS    metoprolol succinate (TOPROL-XL) XL tablet 12.5 mg  12.5 mg Oral QHS    atorvastatin (LIPITOR) tablet 10 mg  10 mg Oral QHS          ASSESSMENT & PLAN     Continue close observation  Discussed meds  Provided support, psycho edu  Discussed with staff in the treatment team, the progress made in therapy, psychosocial needs and nursing concerns. The following regarding medications was addressed during rounds with patient: SEE ABOVE  Ambulatory A's  The patient was given the opportunity to ask questions. Informed consent given to the use of the above medications. Will continue to adjust psychiatric and non-psychiatric medications (see above \"medication\" section and orders section for details) as deemed appropriate & based upon diagnoses and response to treatment. Patient is seen for follow-up he is in the hallway walking and pacing little bit untidy dishevel talking to himself he says sometimes he hears voices sometimes irritable.   Approached the subject of a long-acting injection he says it will take in the community that is assisted living facility staff confirmed that he usually compliant with medication and the patient has little bit guarded paranoid irritable does affect but understood that if you do well potentially can be discharged on the coming Monday based upon the weekend behavior and no side effects continued inpatient level of care indicated thank you    Reviewed blood tests/labs and diagnostic tests as deemed appropriate and review results as they become available (see orders for details and above listed lab/test results). Obtain psychiatric records from previous [facility] hospitals to further elucidate the nature of patient's psychopathology and review once available. Gather additional collateral information from friends, family and o/p treatment team to further elucidate the nature of patient's psychopathology and baselline level of psychiatric functioning. I certify that this patient's inpatient psychiatric hospital services continue to be, required for treatment that could reasonably be expected to improve the patient's condition and that the patient continues to need, on a daily basis, active treatment furnished directly by or requiring the supervision of inpatient psychiatric facility personnel. In addition, the hospital records show that services furnished were intensive treatment services.     Signed By:   Missy Dobbs MD  1/20/2023

## 2023-01-22 LAB
GLUCOSE BLD STRIP.AUTO-MCNC: 105 MG/DL (ref 65–100)
GLUCOSE BLD STRIP.AUTO-MCNC: 87 MG/DL (ref 65–100)
PERFORMED BY, TECHID: ABNORMAL
PERFORMED BY, TECHID: NORMAL

## 2023-01-22 PROCEDURE — 74011250637 HC RX REV CODE- 250/637: Performed by: PSYCHIATRY & NEUROLOGY

## 2023-01-22 PROCEDURE — 82962 GLUCOSE BLOOD TEST: CPT

## 2023-01-22 PROCEDURE — 65220000003 HC RM SEMIPRIVATE PSYCH

## 2023-01-22 RX ADMIN — QUETIAPINE FUMARATE 100 MG: 100 TABLET ORAL at 21:33

## 2023-01-22 RX ADMIN — QUETIAPINE FUMARATE 100 MG: 100 TABLET ORAL at 16:28

## 2023-01-22 RX ADMIN — METOPROLOL SUCCINATE 12.5 MG: 25 TABLET, EXTENDED RELEASE ORAL at 21:34

## 2023-01-22 RX ADMIN — RISPERIDONE 1 MG: 1 TABLET ORAL at 08:36

## 2023-01-22 RX ADMIN — QUETIAPINE FUMARATE 100 MG: 100 TABLET ORAL at 08:36

## 2023-01-22 RX ADMIN — TRAZODONE HYDROCHLORIDE 150 MG: 150 TABLET ORAL at 21:34

## 2023-01-22 RX ADMIN — ATORVASTATIN CALCIUM 10 MG: 10 TABLET, FILM COATED ORAL at 21:34

## 2023-01-22 RX ADMIN — RISPERIDONE 3 MG: 2 TABLET ORAL at 21:33

## 2023-01-22 RX ADMIN — ACETAMINOPHEN 650 MG: 325 TABLET ORAL at 09:54

## 2023-01-22 NOTE — PROGRESS NOTES
Problem: Psychosis  Goal: *STG: Decreased hallucinations  Outcome: Progressing Towards Goal     Problem: Psychosis  Goal: *STG: Decreased delusional thinking  Outcome: Progressing Towards Goal     Problem: Psychosis  Goal: *STG: Seeks staff when feelings of self harm or harm towards others arise  Outcome: Progressing Towards Goal     Problem: Psychosis  Goal: *STG: Patient will verbalize areas in need of boundary recognition and limit setting  Outcome: Progressing Towards Goal     Problem: Psychosis  Goal: *STG/LTG: Complies with medication therapy  Outcome: Progressing Towards Goal

## 2023-01-22 NOTE — BH NOTES
Discussed with patient the importance of his being able to tolerate a roommate, should that be necessary, to further support his readiness for planned discharge tomorrow. Pt agreed. Continues to pace around unit, this writer notes that pt will sometimes talk out loud while walking, but does not appear to be responding to internal stimuli as he was last week when this writer cared for pt. Pt mood, attitude and level of cooperation speaks to pt return to baseline functioning. Pt no longer meets criteria for blocked room.

## 2023-01-22 NOTE — BH NOTES
Alert and oriented to person and place. Affect and mood remains flat and depressed. Pt denies SI/HI at this time. Continues to pace around unit, responding to internal stimuli. Appropriate conversation with writer, to anxious about possible d/c tomorrow. Medication and meal compliant. No bx issues noted or reported. C/O headache 7/10. Tylenol 650 mg given for pain.

## 2023-01-22 NOTE — PROGRESS NOTES
Problem: Psychosis  Goal: *STG: Remains safe in hospital  Outcome: Progressing Towards Goal     Problem: Psychosis  Goal: *STG/LTG: Complies with medication therapy  Outcome: Progressing Towards Goal     Problem: Falls - Risk of  Goal: *Absence of Falls  Description: Document Elieser Fall Risk and appropriate interventions in the flowsheet. Outcome: Progressing Towards Goal  Note: Fall Risk Interventions:     Problem: Psychosis  Goal: *STG: Decreased hallucinations  Outcome: Not Progressing Towards Goal     Problem: Psychosis  Goal: *STG: Decreased delusional thinking  Outcome: Not Progressing Towards Goal     Patient has been safe so far this shift. Patient was medication compliant. Patient has not fallen so far this shift. Patient continues to hear auditory hallucinations. Patient has been alert, oriented, cooperative and pleasant. Patient has been up on the unit, constantly walking and usually talking to himself. He said he was \"fine,\" \"great,\" and that the medication works. He denied depression, anxiety, SI,  HI or hallucinations. He said his sleep and eating are good. His [place] was 113. Later he said was going to be discharged on Monday so he would stay awake until then. He talked about God coming to get the bad people due to it being the end times. He denied paranoia but watched two staff members as they walked in the hallway. He started to present as higher energy with paranoia and was given Ativan 2 MG PO and Trazodone 150 MG PO. He continued to walk and soon laid down. Continue to assess.

## 2023-01-22 NOTE — GROUP NOTE
IP  GROUP DOCUMENTATION INDIVIDUAL                                                                          Group Therapy Note    Date: 1/22/2023    Group Start Time: 1618  Group End Time: 1600  Group Topic: Recreational/Music Therapy    SRM 2  NON ACUTE    Nichelle Thrasher    IP [address] [facility] GROUP DOCUMENTATION GROUP    Group Therapy Note    Attendees: 4/9    Facilitated structured leisure skills group to introduce healthy leisure skills as positive way to cope and manage mood.         Attendance: Did not attend    Additional Notes:  Did not attend group despite encouragement    Dima Parker, CTRS

## 2023-01-22 NOTE — GROUP NOTE
IP  GROUP DOCUMENTATION INDIVIDUAL                                                                          Group Therapy Note    Date: 1/22/2023    Group Start Time: 1115  Group End Time: 1200  Group Topic: Education Group - Inpatient    [facility] 2  NON ACUTE    Nerissa Tinsley    IP [address] [facility] GROUP DOCUMENTATION GROUP    Group Therapy Note    Attendees: 0/9       Attendance: Did not attend    Additional Notes:  Did not attend group despite encouragement .     Janice Shelley, CTRS

## 2023-01-23 VITALS
BODY MASS INDEX: 33.86 KG/M2 | OXYGEN SATURATION: 100 % | WEIGHT: 250 LBS | SYSTOLIC BLOOD PRESSURE: 137 MMHG | HEIGHT: 72 IN | HEART RATE: 89 BPM | RESPIRATION RATE: 18 BRPM | DIASTOLIC BLOOD PRESSURE: 98 MMHG | TEMPERATURE: 97.8 F

## 2023-01-23 LAB
GLUCOSE BLD STRIP.AUTO-MCNC: 83 MG/DL (ref 65–100)
GLUCOSE BLD STRIP.AUTO-MCNC: 89 MG/DL (ref 65–100)
PERFORMED BY, TECHID: NORMAL
PERFORMED BY, TECHID: NORMAL

## 2023-01-23 PROCEDURE — 82962 GLUCOSE BLOOD TEST: CPT

## 2023-01-23 PROCEDURE — 74011250637 HC RX REV CODE- 250/637: Performed by: PSYCHIATRY & NEUROLOGY

## 2023-01-23 RX ORDER — MAG HYDROX/ALUMINUM HYD/SIMETH 200-200-20
30 SUSPENSION, ORAL (FINAL DOSE FORM) ORAL
Qty: 300 ML | Refills: 0 | Status: SHIPPED | OUTPATIENT
Start: 2023-01-23

## 2023-01-23 RX ORDER — CALCIUM CITRATE/VITAMIN D3 200MG-6.25
TABLET ORAL
Qty: 100 STRIP | Refills: 3 | Status: SHIPPED | OUTPATIENT
Start: 2023-01-23

## 2023-01-23 RX ORDER — INSULIN PUMP SYRINGE, 3 ML
EACH MISCELLANEOUS
Qty: 1 KIT | Refills: 0 | Status: SHIPPED | OUTPATIENT
Start: 2023-01-23

## 2023-01-23 RX ORDER — LANCETS
EACH MISCELLANEOUS
Qty: 100 EACH | Refills: 5 | Status: SHIPPED | OUTPATIENT
Start: 2023-01-23

## 2023-01-23 RX ORDER — TRAZODONE HYDROCHLORIDE 150 MG/1
150 TABLET ORAL
Qty: 30 TABLET | Refills: 0 | Status: SHIPPED | OUTPATIENT
Start: 2023-01-23

## 2023-01-23 RX ORDER — FENOFIBRATE 145 MG/1
145 TABLET, COATED ORAL DAILY
Qty: 90 TABLET | Refills: 1 | Status: SHIPPED | OUTPATIENT
Start: 2023-01-23

## 2023-01-23 RX ORDER — IBUPROFEN 200 MG
4 TABLET ORAL AS NEEDED
Qty: 100 TABLET | Refills: 0 | Status: SHIPPED | OUTPATIENT
Start: 2023-01-23

## 2023-01-23 RX ORDER — QUETIAPINE FUMARATE 100 MG/1
100 TABLET, FILM COATED ORAL 3 TIMES DAILY
Qty: 90 TABLET | Refills: 0 | Status: SHIPPED | OUTPATIENT
Start: 2023-01-23

## 2023-01-23 RX ORDER — HYDROXYZINE PAMOATE 50 MG/1
50 CAPSULE ORAL
Qty: 60 CAPSULE | Refills: 0 | Status: SHIPPED | OUTPATIENT
Start: 2023-01-23 | End: 2023-02-06

## 2023-01-23 RX ORDER — ATORVASTATIN CALCIUM 10 MG/1
10 TABLET, FILM COATED ORAL
Qty: 30 TABLET | Refills: 0 | Status: SHIPPED | OUTPATIENT
Start: 2023-01-23

## 2023-01-23 RX ORDER — RISPERIDONE 1 MG/1
1 TABLET, FILM COATED ORAL DAILY
Qty: 30 TABLET | Refills: 0 | Status: SHIPPED | OUTPATIENT
Start: 2023-01-24

## 2023-01-23 RX ORDER — RISPERIDONE 3 MG/1
3 TABLET, FILM COATED ORAL
Qty: 30 TABLET | Refills: 0 | Status: SHIPPED | OUTPATIENT
Start: 2023-01-23

## 2023-01-23 RX ORDER — ACETAMINOPHEN 325 MG/1
650 TABLET ORAL
Qty: 50 TABLET | Refills: 0 | Status: SHIPPED | OUTPATIENT
Start: 2023-01-23

## 2023-01-23 RX ORDER — METOPROLOL SUCCINATE 25 MG/1
12.5 TABLET, EXTENDED RELEASE ORAL
Qty: 30 TABLET | Refills: 0 | Status: SHIPPED | OUTPATIENT
Start: 2023-01-23

## 2023-01-23 RX ADMIN — QUETIAPINE FUMARATE 100 MG: 100 TABLET ORAL at 08:07

## 2023-01-23 RX ADMIN — RISPERIDONE 1 MG: 1 TABLET ORAL at 08:07

## 2023-01-23 NOTE — BH NOTES
Behavioral Health Transition Record to Provider    Patient Name: Kalyan Rothman  YOB: 1985  Medical Record Number: [id number]  Date of Admission: 1/16/2023  Date of Discharge: 01.23.23    Attending Provider: Alessandra Jennings MD  Discharging Provider: Dr. Jay Lucas  To contact this individual call [phone] and ask the  to page. If unavailable, ask to be transferred to [facility] Provider on call. [facility] Provider will be available on call 24/7 and during holidays. Primary Care Provider: Raymon Bonner MD    No Known Allergies    Reason for Admission: Pt was brought to the ED under an ECO by the [facility]. Pt was refusing to take his medications and reported a lack of sleep. Pt denied any SI/HI/AVH but appeared to be internally preoccupied. Admission Diagnosis: Schizoaffective disorder ([facility] [address] [F25.9]    * No surgery found *    Results for orders placed or performed during the hospital encounter of 01/16/23   COVID-19 WITH INFLUENZA A/B   Result Value Ref Range    SARS-CoV-2 by PCR Not Detected Not Detected      Influenza A by PCR Not Detected Not Detected      Influenza B by PCR Not Detected Not Detected     COVID-19 WITH INFLUENZA A/B   Result Value Ref Range    SARS-CoV-2 by PCR Not Detected Not Detected      Influenza A by PCR Not Detected Not Detected      Influenza B by PCR Not Detected Not Detected     DRUG SCREEN, URINE   Result Value Ref Range    AMPHETAMINES Negative Negative      BARBITURATES Negative Negative      BENZODIAZEPINES Negative Negative      COCAINE Negative Negative      METHADONE Negative Negative      OPIATES Negative Negative      PCP(PHENCYCLIDINE) Negative Negative      THC (TH-CANNABINOL) Negative Negative      Drug screen comment        This test is a screen for drugs of abuse in a medical setting only (i.e., they are unconfirmed results and as such must not be used for non-medical purposes, e.g.,employment testing, legal testing).  Due to its inherent nature, false positive (FP) and false negative (FN) results may be obtained. Therefore, if necessary for medical care, recommend confirmation of positive findings by GC/MS. URINALYSIS W/ REFLEX CULTURE    Specimen: Urine   Result Value Ref Range    Color Yellow/Straw      Appearance Clear Clear      Specific gravity 1.025 1.003 - 1.030      pH (UA) 5.0      Protein Negative Negative mg/dL    Glucose >1,000 (A) Negative mg/dL    Ketone Negative Negative mg/dL    Bilirubin Negative Negative      Blood Negative Negative      Urobilinogen 0.1 (L) 0.2 - 1.0 EU/dL    Nitrites Negative Negative      Leukocyte Esterase Negative Negative      WBC 0-4 0 - 4 /hpf    RBC 0-5 0 - 5 /hpf    Epithelial cells Few Few /lpf    Bacteria Negative Negative /hpf    UA:UC IF INDICATED Culture not indicated by UA result Culture not indicated by UA result      Mucus Trace (A) Negative /lpf   CBC WITH AUTOMATED DIFF   Result Value Ref Range    WBC 5.3 4.1 - 11.1 K/uL    RBC 5.37 4.10 - 5.70 M/uL    HGB 15.0 12.1 - 17.0 g/dL    HCT 46.4 36.6 - 50.3 %    MCV 86.4 80.0 - 99.0 FL    MCH 27.9 26.0 - 34.0 PG    MCHC 32.3 30.0 - 36.5 g/dL    RDW 13.5 11.5 - 14.5 %    PLATELET 541 047 - 273 K/uL    MPV 10.7 8.9 - 12.9 FL    NRBC 0.0 0.0  WBC    ABSOLUTE NRBC 0.00 0.00 - 0.01 K/uL    NEUTROPHILS 58 32 - 75 %    LYMPHOCYTES 32 12 - 49 %    MONOCYTES 7 5 - 13 %    EOSINOPHILS 2 0 - 7 %    BASOPHILS 1 0 - 1 %    IMMATURE GRANULOCYTES 0 0 - 0.5 %    ABS. NEUTROPHILS 3.1 1.8 - 8.0 K/UL    ABS. LYMPHOCYTES 1.7 0.8 - 3.5 K/UL    ABS. MONOCYTES 0.4 0.0 - 1.0 K/UL    ABS. EOSINOPHILS 0.1 0.0 - 0.4 K/UL    ABS. BASOPHILS 0.0 0.0 - 0.1 K/UL    ABS. IMM.  GRANS. 0.0 0.00 - 0.04 K/UL    DF AUTOMATED     METABOLIC PANEL, COMPREHENSIVE   Result Value Ref Range    Sodium 139 136 - 145 mmol/L    Potassium 3.7 3.5 - 5.1 mmol/L    Chloride 106 97 - 108 mmol/L    CO2 26 21 - 32 mmol/L    Anion gap 7 5 - 15 mmol/L    Glucose 100 65 - 100 mg/dL BUN 24 (H) 6 - 20 mg/dL    Creatinine 1.32 (H) 0.70 - 1.30 mg/dL    BUN/Creatinine ratio 18 12 - 20      eGFR >60 >60 ml/min/1.73m2    Calcium 9.5 8.5 - 10.1 mg/dL    Bilirubin, total 0.4 0.2 - 1.0 mg/dL    AST (SGOT) 23 15 - 37 U/L    ALT (SGPT) 34 12 - 78 U/L    Alk.  phosphatase 54 45 - 117 U/L    Protein, total 8.1 6.4 - 8.2 g/dL    Albumin 4.4 3.5 - 5.0 g/dL    Globulin 3.7 2.0 - 4.0 g/dL    A-G Ratio 1.2 1.1 - 2.2     ETHYL ALCOHOL   Result Value Ref Range    ALCOHOL(ETHYL),SERUM <10 <48 mg/dL   METABOLIC PANEL, BASIC   Result Value Ref Range    Sodium 140 136 - 145 mmol/L    Potassium 3.6 3.5 - 5.1 mmol/L    Chloride 107 97 - 108 mmol/L    CO2 27 21 - 32 mmol/L    Anion gap 6 5 - 15 mmol/L    Glucose 117 (H) 65 - 100 mg/dL    BUN 11 6 - 20 mg/dL    Creatinine 1.06 0.70 - 1.30 mg/dL    BUN/Creatinine ratio 10 (L) 12 - 20      eGFR >60 >60 ml/min/1.73m2    Calcium 8.8 8.5 - 10.1 mg/dL   GLUCOSE, POC   Result Value Ref Range    Glucose (POC) 78 65 - 100 mg/dL    Performed by [employer] [facility], POC   Result Value Ref Range    Glucose (POC) 121 (H) 65 - 100 mg/dL    Performed by Jovon Farris    GLUCOSE, POC   Result Value Ref Range    Glucose (POC) 90 65 - 100 mg/dL    Performed by [employer]    GLUCOSE, POC   Result Value Ref Range    Glucose (POC) 89 65 - 100 mg/dL    Performed by [employer]    GLUCOSE, POC   Result Value Ref Range    Glucose (POC) 92 65 - 100 mg/dL    Performed by Sherry Guadarrama    GLUCOSE, POC   Result Value Ref Range    Glucose (POC) 132 (H) 65 - 100 mg/dL    Performed by Dg Ch    GLUCOSE, POC   Result Value Ref Range    Glucose (POC) 116 (H) 65 - 100 mg/dL    Performed by Dg Ch    GLUCOSE, POC   Result Value Ref Range    Glucose (POC) 109 (H) 65 - 100 mg/dL    Performed by Dg Ch    GLUCOSE, POC   Result Value Ref Range    Glucose (POC) 109 (H) 65 - 100 mg/dL    Performed by Amy Conde    GLUCOSE, POC   Result Value Ref Range    Glucose (POC) 86 65 - 100 mg/dL    Performed by Osorio Almodovar    GLUCOSE, POC   Result Value Ref Range    Glucose (POC) 118 (H) 65 - 100 mg/dL    Performed by Osorio Castanoly    GLUCOSE, POC   Result Value Ref Range    Glucose (POC) 105 (H) 65 - 100 mg/dL    Performed by [address], POC   Result Value Ref Range    Glucose (POC) 91 65 - 100 mg/dL    Performed by [address], POC   Result Value Ref Range    Glucose (POC) 113 (H) 65 - 100 mg/dL    Performed by Francis Cordero    GLUCOSE, POC   Result Value Ref Range    Glucose (POC) 87 65 - 100 mg/dL    Performed by Mayur Del Valle    GLUCOSE, POC   Result Value Ref Range    Glucose (POC) 105 (H) 65 - 100 mg/dL    Performed by Francis Cordero    GLUCOSE, POC   Result Value Ref Range    Glucose (POC) 89 65 - 100 mg/dL    Performed by Lynn Avalos        Immunizations administered during this encounter:   Immunization History   Administered Date(s) Administered    COVID-19, MODERNA PURPLE border, (age 18y+ booster, 6y-11y series), IM, 50 mcg/0.5 mL 12/08/2021       Screening for Metabolic Disorders for Patients on Antipsychotic Medications  (Data obtained from the EMR)    Estimated Body Mass Index  Estimated body mass index is 33.91 kg/m² as calculated from the following:    Height as of this encounter: 6' (1.829 m). Weight as of this encounter: 113.4 kg (250 lb).      Vital Signs/Blood Pressure  Visit Vitals  BP (!) 137/98 (BP 1 Location: Left upper arm, BP Patient Position: At rest;Sitting)   Pulse 89   Temp 97.8 °F (36.6 °C)   Resp 18   Ht 6' (1.829 m)   Wt 113.4 kg (250 lb)   SpO2 100%   BMI 33.91 kg/m²       Blood Glucose/Hemoglobin A1c  Lab Results   Component Value Date/Time    Glucose 117 (H) 01/19/2023 11:18 AM    Glucose (POC) 89 01/23/2023 07:55 AM       Lab Results   Component Value Date/Time    Hemoglobin A1c 13.0 (H) 11/02/2022 10:20 AM    Hemoglobin A1c, External 13.0 11/02/2022 12:00 AM        Lipid Panel  Lab Results   Component Value Date/Time    Cholesterol, total 199 11/02/2022 10:20 AM    HDL Cholesterol 31 (L) 11/02/2022 10:20 AM    LDL, calculated 107 (H) 11/02/2022 10:20 AM    Triglyceride 352 (H) 11/02/2022 10:20 AM        Discharge Diagnosis: Schizoaffective disorder ([facility] [address] [F25.9]    Discharge Plan: Pt will take a medicaid cab and return to [address] [place] [address]. Pt will  his medications and take them as directed by the doctor. Pt will continue his services through the group home and [facility] up with [facility]. Discharge Medication List and Instructions:   Current Discharge Medication List          Unresulted Labs (24h ago, onward)      None          To obtain results of studies pending at discharge, please contact [phone]    Follow-up Information    None         Advanced Directive:   Does the patient have an appointed surrogate decision maker? No  Does the patient have a Medical Advance Directive? No  Does the patient have a Psychiatric Advance Directive? No  If the patient does not have a surrogate or Medical Advance Directive AND Psychiatric Advance Directive, the patient was offered information on these advance directives Patient will complete at a later time    Patient Instructions: Please continue all medications until otherwise directed by physician. Tobacco Cessation Discharge Plan:   Is the patient a smoker and needs referral for smoking cessation? No  Patient referred to the following for smoking cessation with an appointment? Not applicable     Patient was offered medication to assist with smoking cessation at discharge? Not applicable  Was education for smoking cessation added to the discharge instructions? Not applicable    Alcohol/Substance Abuse Discharge Plan:   Does the patient have a history of substance/alcohol abuse and requires a referral for treatment? No  Patient referred to the following for substance/alcohol abuse treatment with an appointment?  Not applicable  Patient was offered medication to assist with alcohol cessation at discharge? Not applicable  Was education for substance/alcohol abuse added to discharge instructions?  Not applicable    Patient discharged to Inpatient facility; discussed with the receiving facility  Primary physician, other healthcare professional, or site designated for follow up care

## 2023-01-23 NOTE — GROUP NOTE
[facility] GROUP DOCUMENTATION INDIVIDUAL                                                                          Group Therapy Note    Date: 1/23/2023    Group Start Time: 1120  Group End Time: 8286  Group Topic: Education Group - Inpatient    [facility] 2  NON ACUTE    [facility]    IP [address] [facility] GROUP DOCUMENTATION GROUP    Group Therapy Note    Facilitated group to introduce information on the cognitive triangle and discuss how thoughts about a situation, emotions and behaviors affect one another    Attendees: 5/11       Attendance: Did not attend    Additional Notes:  Encouraged but did not attend    OMAR Morton

## 2023-01-23 NOTE — BH NOTES
DISCHARGE SUMMARY    NAME:Jose A Davidson  : 1985  MRN: [id number]    The patient Gab Kapoor exhibits the ability to control behavior in a less restrictive environment. Patient's level of functioning is improving. No assaultive/destructive behavior has been observed for the past 24 hours. No suicidal/homicidal threat or behavior has been observed for the past 24 hours. There is no evidence of serious medication side effects. Patient has not been in physical or protective restraints for at least the past 24 hours. If weapons involved, how are they secured? N/a    Is patient aware of and in agreement with discharge plan? yes    Arrangements for medication:  Prescriptions will be printed and pt will take them to the preferred pharmacy     Copy of discharge instructions to provider?:  yes    Arrangements for transportation home:  Pt will take a medicaid cab back to his group home. Keep all follow up appointments as scheduled, continue to take prescribed medications per physician instructions.   Mental health crisis number:  990 or your local mental health crisis line number at [place] [address] Emergency WARM LINE      1-999-824-MHAV ([phone])      M-F: 9am to 9pm      Sat & Sun: 5pm - 9pm  National suicide prevention lines:                             9-133-FXACDVS (3-709-603-0163)       3-191-446-TALK ([phone])    Crisis Text Line:  Text HOME to 417601

## 2023-01-23 NOTE — PROGRESS NOTES
Problem: Psychosis  Goal: *STG: Remains safe in hospital  Outcome: Progressing Towards Goal     Problem: Psychosis  Goal: *STG/LTG: Complies with medication therapy  Outcome: Progressing Towards Goal     Problem: Falls - Risk of  Goal: *Absence of Falls  Description: Document Elieser Fall Risk and appropriate interventions in the flowsheet. Outcome: Progressing Towards Goal  Note: Fall Risk Interventions:       Patient has been safe so far this shift. Patient was medication compliant. Patient has not fallen so far this shift. Patient remains on close observation. Patient has been alert, oriented, cooperative and pleasant. Patient has been up on the unit. He has been walking the unit. He said he was \"good\" and \"ready to go. \"  He denied depression, anxiety, SI or HI. He denied hallucinations  although he talks to himself continuously. Medication compliant. Accuchek was 105. Since going to bed, patient has been laying down quietly with her eyes closed. 0300--patient has been up walking around. At [id number], patient is still pacing the unit. 0440--Patient walking on the unit talking and smiling to himself.

## 2023-01-23 NOTE — BH NOTES
DISCHARGE NOTE    Discharge instructions reviewed and understood with the patient. Pt verbalized understanding. Prescriptions and medications reviewed with the patient, verbalized understanding. Prescriptions sent to group home pharmacy . Valuables returned to pt from safe and storage closet.  Pt discharged via lyft without any complaints voiced at 11:56 am.

## 2023-01-24 NOTE — DISCHARGE SUMMARY
uttgmaury Santos 23 SUMMARY    Name:  Swapna Juarez  MR#:  [id number]  :  1985  ACCOUNT #:  [de-identified]  ADMIT DATE:  2023  DISCHARGE DATE:  2023    Please make reference to my initial psychiatric H and P. This is a 28-year-old single [facility] male patient admitted to behavioral health unit under TDO. It was reported he tried to kill himself. He was not himself, talking to himself, which the patient denies. The patient sees Dr. Adriano Thomason and taking trazodone 150 mg at night, risperidone 1 mg in the morning and 3 mg at night, Seroquel XR. He is also taking medications for cholesterol, blood pressure and diabetes. The patient is a poor historian, paranoid, turned out that eduar made up the story. COURSE AND TREATMENT DURING THE HOSPITALIZATION:  The patient later threatened the staff that he is going to show off for keeping him here and threw a tray on the floor. The sta no further behavior, and that does not allow us to discharge, we need to show more maturity and proper behavior. He got the message. He participated, even though he remained sometimes irritable, frustrated, did not do any acting out. He was compliant with medications, . Medications were adjusted. developed patience, coping skills. Staff in touch with his group home staff and subsequently discharged. LABORATORIES:  COVID-19, influenza A and B not detected. Alcohol level 10. Metabolic panel:  Creatinine , BUN 20, otherwise unremarkable. Liver functions:  Unremarkable. WBC unremarkable. Urinalysis unremarkable. Insulin was not given, his blood sugars were around 100. His lipid panel:  Cholesterol 99, HDL 31, , triglycerides 356. panel. His hemoglobin A1c was . 13  Glucose level was 89 on . ALLERGIES TO MEDICATIONS:  NONE. No surgeries were performed during this admission. VITAL SIGNS:  /90, pulse 89, temperature 98.2, respirations 18.   Height 6 feet, weight 113.4 kg. SpO2 of 100 at room air. DISCHARGE MEDICATIONS:  1. Tylenol two tablets every 6 hours p.r.n.  2.  Mylanta 30 mL every 8 hours p.r.n.  3.  Glucose 4 g four tablets p.r.n. for hypoglycemia. 4.  Vistaril 50 mg twice a day p.r.n.  5.  Toprol-XL 25 mg half a tablet daily. 6.  Seroquel 100 mg three times a day. 7.  Risperidone 1 mg in the morning, 3 mg at night. 8.  Trazodone 150 mg at night. 9.  Januvia 50 mg one tablet daily. 10.  Fenofibrate 145 mg one tablet daily. 11.  Jardiance 10 mg daily. 12.  Lipitor 10 mg daily at night. Discharge medications were electronically sent to [facility]. DISCHARGE DIAGNOSES:  Schizoaffective disorder. Hyperlipidemia. Hypertension. Type 2 diabetes mellitus. Follow up with the regular psychiatrist.  Alpesh Garnica as improved. Not suicidal.  Not homicidal.   improved. Hallucinations cleared. Happy spirit, compliant status.       Tasha Tyson MD      [id number]  D:  01/24/2023 1:13  T:  01/24/2023 18:10  JOB #:  [id number]

## 2023-03-10 ENCOUNTER — TELEPHONE (OUTPATIENT)
Dept: INTERNAL MEDICINE CLINIC | Age: 38
End: 2023-03-10

## 2023-03-13 LAB
CREATININE, EXTERNAL: 1.19
HBA1C MFR BLD HPLC: 7 %
LDL-C, EXTERNAL: 72
MICROALBUMIN UR TEST STR-MCNC: <3 MG/DL
MICROALBUMIN/CREAT RATIO, EXTERNAL: <30
TOTAL CHOLESTEROL, NCHOLT: 152

## 2023-04-25 DIAGNOSIS — E11.65 UNCONTROLLED TYPE 2 DIABETES MELLITUS WITH HYPERGLYCEMIA (HCC): ICD-10-CM

## 2023-04-26 RX ORDER — CALCIUM CITRATE/VITAMIN D3 200MG-6.25
TABLET ORAL
Qty: 100 STRIP | Refills: 3 | Status: SHIPPED | OUTPATIENT
Start: 2023-04-26

## 2023-05-02 ENCOUNTER — OFFICE VISIT (OUTPATIENT)
Dept: INTERNAL MEDICINE CLINIC | Age: 38
End: 2023-05-02
Payer: MEDICARE

## 2023-05-02 VITALS
DIASTOLIC BLOOD PRESSURE: 94 MMHG | TEMPERATURE: 98.1 F | HEART RATE: 92 BPM | SYSTOLIC BLOOD PRESSURE: 140 MMHG | OXYGEN SATURATION: 99 % | HEIGHT: 71 IN | BODY MASS INDEX: 34.87 KG/M2

## 2023-05-02 DIAGNOSIS — E78.2 MIXED HYPERLIPIDEMIA: ICD-10-CM

## 2023-05-02 DIAGNOSIS — G47.33 OSA ON CPAP: ICD-10-CM

## 2023-05-02 DIAGNOSIS — Z11.1 SCREENING-PULMONARY TB: ICD-10-CM

## 2023-05-02 DIAGNOSIS — E11.65 UNCONTROLLED TYPE 2 DIABETES MELLITUS WITH HYPERGLYCEMIA (HCC): ICD-10-CM

## 2023-05-02 DIAGNOSIS — E11.69 TYPE 2 DIABETES MELLITUS WITH HYPERCHOLESTEROLEMIA (HCC): Primary | ICD-10-CM

## 2023-05-02 DIAGNOSIS — E11.9 ENCOUNTER FOR DIABETIC FOOT EXAM (HCC): ICD-10-CM

## 2023-05-02 DIAGNOSIS — E78.00 TYPE 2 DIABETES MELLITUS WITH HYPERCHOLESTEROLEMIA (HCC): Primary | ICD-10-CM

## 2023-05-02 DIAGNOSIS — I10 ESSENTIAL HYPERTENSION: ICD-10-CM

## 2023-05-02 DIAGNOSIS — Z99.89 OSA ON CPAP: ICD-10-CM

## 2023-05-02 DIAGNOSIS — Z11.59 ENCOUNTER FOR HEPATITIS C SCREENING TEST FOR LOW RISK PATIENT: ICD-10-CM

## 2023-05-02 PROCEDURE — 99214 OFFICE O/P EST MOD 30 MIN: CPT | Performed by: INTERNAL MEDICINE

## 2023-05-02 PROCEDURE — G8417 CALC BMI ABV UP PARAM F/U: HCPCS | Performed by: INTERNAL MEDICINE

## 2023-05-02 PROCEDURE — G8427 DOCREV CUR MEDS BY ELIG CLIN: HCPCS | Performed by: INTERNAL MEDICINE

## 2023-05-02 PROCEDURE — 3078F DIAST BP <80 MM HG: CPT | Performed by: INTERNAL MEDICINE

## 2023-05-02 PROCEDURE — 3074F SYST BP LT 130 MM HG: CPT | Performed by: INTERNAL MEDICINE

## 2023-05-02 PROCEDURE — G9717 DOC PT DX DEP/BP F/U NT REQ: HCPCS | Performed by: INTERNAL MEDICINE

## 2023-05-02 PROCEDURE — 2022F DILAT RTA XM EVC RTNOPTHY: CPT | Performed by: INTERNAL MEDICINE

## 2023-05-02 PROCEDURE — 3051F HG A1C>EQUAL 7.0%<8.0%: CPT | Performed by: INTERNAL MEDICINE

## 2023-05-02 RX ORDER — FENOFIBRATE 145 MG/1
145 TABLET, COATED ORAL DAILY
Qty: 90 TABLET | Refills: 1 | Status: SHIPPED | OUTPATIENT
Start: 2023-05-02

## 2023-05-09 DIAGNOSIS — Z11.59 ENCOUNTER FOR HEPATITIS C SCREENING TEST FOR LOW RISK PATIENT: ICD-10-CM

## 2023-05-09 DIAGNOSIS — E78.00 TYPE 2 DIABETES MELLITUS WITH HYPERCHOLESTEROLEMIA (HCC): Primary | ICD-10-CM

## 2023-05-09 DIAGNOSIS — E11.69 TYPE 2 DIABETES MELLITUS WITH HYPERCHOLESTEROLEMIA (HCC): Primary | ICD-10-CM

## 2023-05-09 DIAGNOSIS — Z11.59 ENCOUNTER FOR HEPATITIS C SCREENING TEST FOR LOW RISK PATIENT: Primary | ICD-10-CM

## 2023-05-09 DIAGNOSIS — E78.2 MIXED HYPERLIPIDEMIA: ICD-10-CM

## 2023-05-09 DIAGNOSIS — E78.2 MIXED HYPERLIPIDEMIA: Primary | ICD-10-CM

## 2023-05-09 DIAGNOSIS — E11.69 TYPE 2 DIABETES MELLITUS WITH HYPERCHOLESTEROLEMIA (HCC): ICD-10-CM

## 2023-05-09 DIAGNOSIS — E78.00 TYPE 2 DIABETES MELLITUS WITH HYPERCHOLESTEROLEMIA (HCC): ICD-10-CM

## 2023-05-15 RX ORDER — ATORVASTATIN CALCIUM 10 MG/1
TABLET, FILM COATED ORAL
Qty: 31 TABLET | Refills: 1 | Status: SHIPPED | OUTPATIENT
Start: 2023-05-15 | End: 2023-07-13

## 2023-05-31 ENCOUNTER — OFFICE VISIT (OUTPATIENT)
Facility: CLINIC | Age: 38
End: 2023-05-31

## 2023-05-31 VITALS
DIASTOLIC BLOOD PRESSURE: 80 MMHG | WEIGHT: 298.6 LBS | SYSTOLIC BLOOD PRESSURE: 130 MMHG | OXYGEN SATURATION: 98 % | HEIGHT: 73 IN | BODY MASS INDEX: 39.57 KG/M2 | TEMPERATURE: 97.8 F | HEART RATE: 94 BPM | RESPIRATION RATE: 18 BRPM

## 2023-05-31 DIAGNOSIS — Z11.1 TUBERCULOSIS SCREENING: ICD-10-CM

## 2023-05-31 DIAGNOSIS — Z00.01 ENCOUNTER FOR ANNUAL GENERAL MEDICAL EXAMINATION WITH ABNORMAL FINDINGS IN ADULT: ICD-10-CM

## 2023-05-31 DIAGNOSIS — Z00.01 ENCOUNTER FOR ANNUAL GENERAL MEDICAL EXAMINATION WITH ABNORMAL FINDINGS IN ADULT: Primary | ICD-10-CM

## 2023-05-31 DIAGNOSIS — E11.65 TYPE 2 DIABETES MELLITUS WITH HYPERGLYCEMIA, WITHOUT LONG-TERM CURRENT USE OF INSULIN (HCC): ICD-10-CM

## 2023-05-31 RX ORDER — ACETAMINOPHEN 325 MG/1
650 TABLET ORAL EVERY 6 HOURS PRN
COMMUNITY
Start: 2023-01-23

## 2023-05-31 RX ORDER — SITAGLIPTIN AND METFORMIN HYDROCHLORIDE 1000; 50 MG/1; MG/1
1 TABLET, FILM COATED ORAL 2 TIMES DAILY
COMMUNITY
Start: 2023-05-01

## 2023-05-31 RX ORDER — MAGNESIUM HYDROXIDE/ALUMINUM HYDROXICE/SIMETHICONE 120; 1200; 1200 MG/30ML; MG/30ML; MG/30ML
30 SUSPENSION ORAL EVERY 4 HOURS PRN
COMMUNITY
Start: 2023-01-23

## 2023-05-31 RX ORDER — EMPAGLIFLOZIN 25 MG/1
1 TABLET, FILM COATED ORAL DAILY
COMMUNITY
Start: 2023-05-01

## 2023-05-31 RX ORDER — FENOFIBRATE 145 MG/1
1 TABLET, COATED ORAL DAILY
COMMUNITY
Start: 2023-05-01

## 2023-05-31 RX ORDER — TRAZODONE HYDROCHLORIDE 150 MG/1
1 TABLET ORAL NIGHTLY PRN
COMMUNITY
Start: 2023-01-23

## 2023-05-31 RX ORDER — GLUCOSAM/CHON-MSM1/C/MANG/BOSW 500-416.6
TABLET ORAL
COMMUNITY
Start: 2023-04-25

## 2023-05-31 RX ORDER — RISPERIDONE 3 MG/1
1 TABLET ORAL
COMMUNITY
Start: 2023-05-01

## 2023-05-31 RX ORDER — RISPERIDONE 1 MG/1
1 TABLET ORAL DAILY
COMMUNITY
Start: 2023-05-01

## 2023-05-31 RX ORDER — METOPROLOL SUCCINATE 25 MG/1
1 TABLET, EXTENDED RELEASE ORAL DAILY
COMMUNITY
Start: 2023-05-01

## 2023-05-31 RX ORDER — CALCIUM CITRATE/VITAMIN D3 200MG-6.25
TABLET ORAL
COMMUNITY
Start: 2023-04-25

## 2023-05-31 RX ORDER — QUETIAPINE FUMARATE 100 MG/1
1 TABLET, FILM COATED ORAL DAILY
COMMUNITY
Start: 2023-05-01 | End: 2023-05-31

## 2023-05-31 SDOH — ECONOMIC STABILITY: FOOD INSECURITY: WITHIN THE PAST 12 MONTHS, THE FOOD YOU BOUGHT JUST DIDN'T LAST AND YOU DIDN'T HAVE MONEY TO GET MORE.: NEVER TRUE

## 2023-05-31 SDOH — ECONOMIC STABILITY: HOUSING INSECURITY
IN THE LAST 12 MONTHS, WAS THERE A TIME WHEN YOU DID NOT HAVE A STEADY PLACE TO SLEEP OR SLEPT IN A SHELTER (INCLUDING NOW)?: NO

## 2023-05-31 SDOH — ECONOMIC STABILITY: INCOME INSECURITY: HOW HARD IS IT FOR YOU TO PAY FOR THE VERY BASICS LIKE FOOD, HOUSING, MEDICAL CARE, AND HEATING?: NOT HARD AT ALL

## 2023-05-31 SDOH — ECONOMIC STABILITY: FOOD INSECURITY: WITHIN THE PAST 12 MONTHS, YOU WORRIED THAT YOUR FOOD WOULD RUN OUT BEFORE YOU GOT MONEY TO BUY MORE.: NEVER TRUE

## 2023-05-31 ASSESSMENT — PATIENT HEALTH QUESTIONNAIRE - PHQ9
SUM OF ALL RESPONSES TO PHQ QUESTIONS 1-9: 0
5. POOR APPETITE OR OVEREATING: 0
SUM OF ALL RESPONSES TO PHQ QUESTIONS 1-9: 0
SUM OF ALL RESPONSES TO PHQ9 QUESTIONS 1 & 2: 0
6. FEELING BAD ABOUT YOURSELF - OR THAT YOU ARE A FAILURE OR HAVE LET YOURSELF OR YOUR FAMILY DOWN: 0
4. FEELING TIRED OR HAVING LITTLE ENERGY: 0
1. LITTLE INTEREST OR PLEASURE IN DOING THINGS: 0
10. IF YOU CHECKED OFF ANY PROBLEMS, HOW DIFFICULT HAVE THESE PROBLEMS MADE IT FOR YOU TO DO YOUR WORK, TAKE CARE OF THINGS AT HOME, OR GET ALONG WITH OTHER PEOPLE: 0
SUM OF ALL RESPONSES TO PHQ QUESTIONS 1-9: 0
3. TROUBLE FALLING OR STAYING ASLEEP: 0
7. TROUBLE CONCENTRATING ON THINGS, SUCH AS READING THE NEWSPAPER OR WATCHING TELEVISION: 0
9. THOUGHTS THAT YOU WOULD BE BETTER OFF DEAD, OR OF HURTING YOURSELF: 0
8. MOVING OR SPEAKING SO SLOWLY THAT OTHER PEOPLE COULD HAVE NOTICED. OR THE OPPOSITE, BEING SO FIGETY OR RESTLESS THAT YOU HAVE BEEN MOVING AROUND A LOT MORE THAN USUAL: 0
2. FEELING DOWN, DEPRESSED OR HOPELESS: 0
SUM OF ALL RESPONSES TO PHQ QUESTIONS 1-9: 0

## 2023-05-31 ASSESSMENT — ANXIETY QUESTIONNAIRES
7. FEELING AFRAID AS IF SOMETHING AWFUL MIGHT HAPPEN: 0
5. BEING SO RESTLESS THAT IT IS HARD TO SIT STILL: 0
4. TROUBLE RELAXING: 0
GAD7 TOTAL SCORE: 0
2. NOT BEING ABLE TO STOP OR CONTROL WORRYING: 0
1. FEELING NERVOUS, ANXIOUS, OR ON EDGE: 0
IF YOU CHECKED OFF ANY PROBLEMS ON THIS QUESTIONNAIRE, HOW DIFFICULT HAVE THESE PROBLEMS MADE IT FOR YOU TO DO YOUR WORK, TAKE CARE OF THINGS AT HOME, OR GET ALONG WITH OTHER PEOPLE: NOT DIFFICULT AT ALL
3. WORRYING TOO MUCH ABOUT DIFFERENT THINGS: 0
6. BECOMING EASILY ANNOYED OR IRRITABLE: 0

## 2023-05-31 NOTE — PROGRESS NOTES
1. \"Have you been to the ER, urgent care clinic since your last visit? Hospitalized since your last visit? \" No    2. \"Have you seen or consulted any other health care providers outside of the [address] [place] [address] since your last visit? \" No     3. For patients aged 39-70: Has the patient had a colonoscopy / FIT/ Cologuard? NA - based on age      If the patient is female:    4. For patients aged 41-77: Has the patient had a mammogram within the past 2 years? NA - based on age or sex      11. For patients aged 21-65: Has the patient had a pap smear? NA - based on age or sex         2023    Samanta Dean (:  1985) is a 40 y.o. male, here for a group home physical. Pt states that he has not seen any specialty provider. No c/o pain. Recent labs done by [employer] on 2023. Patient is with  group home staff Nicole Grow and she wants a referral to a dietitian or diet change instructions for the patient. She stated echocardiogram test has not been done so far, does not member the date of last tetanus vaccine. Patient stated his glucose was 89 today morning and is planning to go to the gym    Patient Active Problem List   Diagnosis    Mixed hyperlipidemia    Bipolar disorder (HCC)    Elevated fasting glucose    Snoring    Hypercholesterolemia    Obesity (BMI 30-39. 9)    Diastolic blood pressure 90 mm Hg or higher    Schizoaffective disorder (HCC)       Review of Systems  Constitutional:  Negative for chills and fever. HENT:  Negative for congestion, ear pain, nosebleeds, sinus pain, sore throat and tinnitus. Eyes:  Negative for redness. Respiratory:  Negative for cough and shortness of breath. Cardiovascular:  Negative for chest pain and palpitations. Gastrointestinal:  Negative for abdominal pain, diarrhea, nausea and vomiting. Endocrine: Negative for cold intolerance and polyuria. Genitourinary:  Negative for dysuria and hematuria. Musculoskeletal:  Negative for back pain and neck pain.

## 2023-06-18 DIAGNOSIS — E78.2 MIXED HYPERLIPIDEMIA: ICD-10-CM

## 2023-06-18 DIAGNOSIS — E11.65 TYPE 2 DIABETES MELLITUS WITH HYPERGLYCEMIA, WITHOUT LONG-TERM CURRENT USE OF INSULIN (HCC): ICD-10-CM

## 2023-06-26 ENCOUNTER — TELEPHONE (OUTPATIENT)
Facility: CLINIC | Age: 38
End: 2023-06-26

## 2023-06-30 RX ORDER — CALCIUM CITRATE/VITAMIN D3 200MG-6.25
TABLET ORAL
Qty: 100 EACH | Refills: 5 | Status: SHIPPED | OUTPATIENT
Start: 2023-06-30

## 2023-07-13 RX ORDER — ATORVASTATIN CALCIUM 10 MG/1
TABLET, FILM COATED ORAL
Qty: 31 TABLET | Refills: 4 | Status: SHIPPED | OUTPATIENT
Start: 2023-07-13

## 2023-09-13 RX ORDER — EMPAGLIFLOZIN 25 MG/1
TABLET, FILM COATED ORAL DAILY
Qty: 30 TABLET | Refills: 5 | Status: SHIPPED | OUTPATIENT
Start: 2023-09-13

## 2023-09-20 ENCOUNTER — OFFICE VISIT (OUTPATIENT)
Facility: CLINIC | Age: 38
End: 2023-09-20
Payer: MEDICARE

## 2023-09-20 VITALS
BODY MASS INDEX: 39.49 KG/M2 | HEART RATE: 92 BPM | TEMPERATURE: 98.3 F | HEIGHT: 73 IN | OXYGEN SATURATION: 100 % | WEIGHT: 298 LBS | DIASTOLIC BLOOD PRESSURE: 94 MMHG | SYSTOLIC BLOOD PRESSURE: 128 MMHG

## 2023-09-20 DIAGNOSIS — E78.2 MIXED HYPERLIPIDEMIA: ICD-10-CM

## 2023-09-20 DIAGNOSIS — E11.65 TYPE 2 DIABETES MELLITUS WITH HYPERGLYCEMIA, WITHOUT LONG-TERM CURRENT USE OF INSULIN (HCC): Primary | ICD-10-CM

## 2023-09-20 DIAGNOSIS — I10 ESSENTIAL HYPERTENSION, BENIGN: ICD-10-CM

## 2023-09-20 DIAGNOSIS — E66.9 OBESITY (BMI 30-39.9): ICD-10-CM

## 2023-09-20 DIAGNOSIS — E11.65 TYPE 2 DIABETES MELLITUS WITH HYPERGLYCEMIA, WITHOUT LONG-TERM CURRENT USE OF INSULIN (HCC): ICD-10-CM

## 2023-09-20 PROBLEM — R73.01 ELEVATED FASTING GLUCOSE: Status: ACTIVE | Noted: 2022-10-12

## 2023-09-20 PROBLEM — E78.00 HYPERCHOLESTEROLEMIA: Status: ACTIVE | Noted: 2022-10-18

## 2023-09-20 PROBLEM — R03.0 DIASTOLIC BLOOD PRESSURE 90 MM HG OR HIGHER: Status: ACTIVE | Noted: 2022-10-12

## 2023-09-20 PROCEDURE — G8417 CALC BMI ABV UP PARAM F/U: HCPCS | Performed by: INTERNAL MEDICINE

## 2023-09-20 PROCEDURE — 99214 OFFICE O/P EST MOD 30 MIN: CPT | Performed by: INTERNAL MEDICINE

## 2023-09-20 PROCEDURE — G8427 DOCREV CUR MEDS BY ELIG CLIN: HCPCS | Performed by: INTERNAL MEDICINE

## 2023-09-20 PROCEDURE — 3080F DIAST BP >= 90 MM HG: CPT | Performed by: INTERNAL MEDICINE

## 2023-09-20 PROCEDURE — 1036F TOBACCO NON-USER: CPT | Performed by: INTERNAL MEDICINE

## 2023-09-20 PROCEDURE — 2022F DILAT RTA XM EVC RTNOPTHY: CPT | Performed by: INTERNAL MEDICINE

## 2023-09-20 PROCEDURE — 3052F HG A1C>EQUAL 8.0%<EQUAL 9.0%: CPT | Performed by: INTERNAL MEDICINE

## 2023-09-20 PROCEDURE — 3074F SYST BP LT 130 MM HG: CPT | Performed by: INTERNAL MEDICINE

## 2023-09-20 RX ORDER — QUETIAPINE FUMARATE 100 MG/1
3 TABLET, FILM COATED ORAL
COMMUNITY
Start: 2023-09-01

## 2023-09-20 RX ORDER — TIRZEPATIDE 2.5 MG/.5ML
2.5 INJECTION, SOLUTION SUBCUTANEOUS WEEKLY
Qty: 4 ML | Refills: 1 | Status: SHIPPED | OUTPATIENT
Start: 2023-09-20

## 2023-09-20 NOTE — PROGRESS NOTES
[address] [facility] Internal Medicine  [address] [facility], [address]  Phone: [phone]      Pedro Luis Pedraza (: 1985) is a 40 y.o. male, established patient, here for evaluation of the following chief complaint(s):  Discuss Labs and Follow-up Chronic Condition     SUBJECTIVE/OBJECTIVE:  HPI:  Patient is here for follow up, he recently had blood work. He checks his blood sugar 1 time daily; some of his blood sugar ranges . He  did feel shaky when  his sugar was low at 70,he ate something and felt better. He has not had his annual eye exam for . He has not been in the ER or the hospital since his last visit. He has Paddy Daugherty with him, his  at the group [place]. 06/15/23 Hemo 14.7, Hemo 45.2, Glu 93, Creat 1.18, K+ 4.4, URINE WBC 1+, Glu 3+, Chol T 137, Trig 191, HDL 31, LDL 74, Alb/Creat 11, QuantiFERON Neg, A1c 8.3.   Current Outpatient Medications   Medication Sig    QUEtiapine (SEROQUEL) 100 MG tablet Take 3 tablets by mouth nightly    Tirzepatide (MOUNJARO) 2.5 MG/0.5ML SOPN SC injection Inject 0.5 mLs into the skin once a week    JARDIANCE 25 MG tablet TAKE 1 TABLET BY MOUTH DAILY    atorvastatin (LIPITOR) 10 MG tablet TAKE 1 TABLET BY MOUTH AT BEDTIME    TRUE METRIX BLOOD GLUCOSE TEST strip Use to check blood glucose twice a day    fenofibrate (TRICOR) 145 MG tablet Take 1 tablet by mouth daily    JANUMET  MG per tablet Take 1 tablet by mouth in the morning and at bedtime    metoprolol succinate (TOPROL XL) 25 MG extended release tablet Take 1 tablet by mouth daily    risperiDONE (RISPERDAL) 1 MG tablet Take 1 tablet by mouth daily    risperiDONE (RISPERDAL) 3 MG tablet Take 1 tablet by mouth nightly    TRUEplus Lancets 30G MISC Use to check blood sugars twice a day    traZODone (DESYREL) 150 MG tablet Take 1 tablet by mouth nightly as needed    acetaminophen (TYLENOL) 325 MG tablet Take 2 tablets by mouth every 6 hours as needed    aluminum & magnesium

## 2023-09-20 NOTE — PROGRESS NOTES
Chief Complaint   Patient presents with    Discuss Labs    Follow-up Chronic Condition         1. \"Have you been to the ER, urgent care clinic since your last visit? Hospitalized since your last visit? \" No    2. \"Have you seen or consulted any other health care providers outside of the [address] [place] [address] since your last visit? \" No     3. For patients aged 43-73: Has the patient had a colonoscopy / FIT/ Cologuard? NA - based on age      If the patient is female:    4. For patients aged 43-66: Has the patient had a mammogram within the past 2 years? NA - based on age or sex      11. For patients aged 21-65: Has the patient had a pap smear?  NA - based on age or sex

## 2023-11-03 RX ORDER — CALCIUM CITRATE/VITAMIN D3 200MG-6.25
TABLET ORAL
Qty: 100 STRIP | Refills: 12 | Status: SHIPPED | OUTPATIENT
Start: 2023-11-03

## 2023-11-08 ENCOUNTER — OFFICE VISIT (OUTPATIENT)
Facility: CLINIC | Age: 38
End: 2023-11-08
Payer: MEDICAID

## 2023-11-08 VITALS
OXYGEN SATURATION: 98 % | WEIGHT: 297 LBS | TEMPERATURE: 97.9 F | DIASTOLIC BLOOD PRESSURE: 88 MMHG | SYSTOLIC BLOOD PRESSURE: 122 MMHG | BODY MASS INDEX: 39.36 KG/M2 | HEIGHT: 73 IN | HEART RATE: 97 BPM

## 2023-11-08 DIAGNOSIS — E66.01 SEVERE OBESITY (BMI 35.0-39.9) WITH COMORBIDITY (HCC): ICD-10-CM

## 2023-11-08 DIAGNOSIS — I10 ESSENTIAL HYPERTENSION, BENIGN: ICD-10-CM

## 2023-11-08 DIAGNOSIS — E78.2 MIXED HYPERLIPIDEMIA: ICD-10-CM

## 2023-11-08 DIAGNOSIS — E11.65 TYPE 2 DIABETES MELLITUS WITH HYPERGLYCEMIA, WITHOUT LONG-TERM CURRENT USE OF INSULIN (HCC): Primary | ICD-10-CM

## 2023-11-08 DIAGNOSIS — E11.65 TYPE 2 DIABETES MELLITUS WITH HYPERGLYCEMIA, WITHOUT LONG-TERM CURRENT USE OF INSULIN (HCC): ICD-10-CM

## 2023-11-08 PROBLEM — E11.9 DIABETES MELLITUS TYPE 2 WITHOUT RETINOPATHY (HCC): Status: ACTIVE | Noted: 2023-11-08

## 2023-11-08 PROCEDURE — 3079F DIAST BP 80-89 MM HG: CPT | Performed by: INTERNAL MEDICINE

## 2023-11-08 PROCEDURE — 99214 OFFICE O/P EST MOD 30 MIN: CPT | Performed by: INTERNAL MEDICINE

## 2023-11-08 PROCEDURE — 3052F HG A1C>EQUAL 8.0%<EQUAL 9.0%: CPT | Performed by: INTERNAL MEDICINE

## 2023-11-08 PROCEDURE — 3074F SYST BP LT 130 MM HG: CPT | Performed by: INTERNAL MEDICINE

## 2023-11-08 NOTE — PROGRESS NOTES
[address] [facility] Internal Medicine  [address] [facility] [address], [address]  Phone: [phone]      Yonatan Mitchell (: 1985) is a 40 y.o. male, established patient, here for evaluation of the following chief complaint(s):  Follow-up Chronic Condition     SUBJECTIVE/OBJECTIVE:  HPI:  Patient is here for follow up, he did not have routine blood work for this visit. He checks his blood sugar 1 time daily; some of his blood sugar ranges . He denies any episodes of low blood sugar and he needs to schedule his annual eye exam for . He has been taking his Mounjaro blood as not been able to get his [facility] for the last week, he needed a PA. He has Shibon with him today, his caregiver she stated she tried to reach the dietitian but could not get through. He has not been in ER or the hospital since his last visit.      Hemoglobin   Date Value Ref Range Status   06/15/2023 14.7 13.0 - 17.7 g/dL Final     Hematocrit   Date Value Ref Range Status   06/15/2023 45.2 37.5 - 51.0 % Final     Creatinine   Date Value Ref Range Status   06/15/2023 1.18 0.76 - 1.27 mg/dL Final     Glucose   Date Value Ref Range Status   06/15/2023 93 70 - 99 mg/dL Final     Potassium   Date Value Ref Range Status   06/15/2023 4.4 3.5 - 5.2 mmol/L Final     Cholesterol, Total   Date Value Ref Range Status   2023 152 100 - 199 mg/dL Final     Cholesterol   Date Value Ref Range Status   06/15/2023 137 100 - 199 mg/dL Final     HDL   Date Value Ref Range Status   06/15/2023 31 (L) >39 mg/dL Final     LDL Calculated   Date Value Ref Range Status   06/15/2023 74 0 - 99 mg/dL Final     Triglycerides   Date Value Ref Range Status   06/15/2023 191 (H) 0 - 149 mg/dL Final          Current Outpatient Medications   Medication Sig    Tdap (ADACEL) 5-2-15.5 LF-MCG/0.5 injection Inject 0.5 mLs into the muscle once for 1 dose    blood glucose test strips (TRUE METRIX BLOOD GLUCOSE TEST) strip USE TO CHECK BLOOD SUGARS TWICE

## 2023-11-08 NOTE — PROGRESS NOTES
Chief Complaint   Patient presents with    Follow-up Chronic Condition         1. \"Have you been to the ER, urgent care clinic since your last visit? Hospitalized since your last visit? \" No    2. \"Have you seen or consulted any other health care providers outside of the [address] [place] [address] since your last visit? \" No     3. For patients aged 43-73: Has the patient had a colonoscopy / FIT/ Cologuard? NA - based on age      If the patient is female:    4. For patients aged 43-66: Has the patient had a mammogram within the past 2 years? NA - based on age or sex      11. For patients aged 21-65: Has the patient had a pap smear?  NA - based on age or sex

## 2023-11-09 RX ORDER — GLUCOSAM/CHON-MSM1/C/MANG/BOSW 500-416.6
TABLET ORAL
Qty: 100 EACH | Refills: 2 | Status: SHIPPED | OUTPATIENT
Start: 2023-11-09

## 2023-11-10 LAB
ALBUMIN SERPL-MCNC: 4.4 G/DL (ref 4.1–5.1)
ALBUMIN/GLOB SERPL: 1.7 {RATIO} (ref 1.2–2.2)
ALP SERPL-CCNC: 59 IU/L (ref 44–121)
ALT SERPL-CCNC: 30 IU/L (ref 0–44)
APPEARANCE UR: CLEAR
AST SERPL-CCNC: 30 IU/L (ref 0–40)
BILIRUB SERPL-MCNC: 0.3 MG/DL (ref 0–1.2)
BILIRUB UR QL STRIP: NEGATIVE
BUN SERPL-MCNC: 13 MG/DL (ref 6–20)
BUN/CREAT SERPL: 11 (ref 9–20)
CALCIUM SERPL-MCNC: 9.5 MG/DL (ref 8.7–10.2)
CHLORIDE SERPL-SCNC: 102 MMOL/L (ref 96–106)
CHOLEST SERPL-MCNC: 123 MG/DL (ref 100–199)
CO2 SERPL-SCNC: 20 MMOL/L (ref 20–29)
COLOR UR: YELLOW
CREAT SERPL-MCNC: 1.16 MG/DL (ref 0.76–1.27)
EGFRCR SERPLBLD CKD-EPI 2021: 84 ML/MIN/1.73
GLOBULIN SER CALC-MCNC: 2.6 G/DL (ref 1.5–4.5)
GLUCOSE SERPL-MCNC: 111 MG/DL (ref 70–99)
GLUCOSE UR QL STRIP: ABNORMAL
HBA1C MFR BLD: 6.9 % (ref 4.8–5.6)
HDLC SERPL-MCNC: 29 MG/DL
HGB UR QL STRIP: NEGATIVE
KETONES UR QL STRIP: NEGATIVE
LDLC SERPL CALC-MCNC: 65 MG/DL (ref 0–99)
LEUKOCYTE ESTERASE UR QL STRIP: ABNORMAL
NITRITE UR QL STRIP: NEGATIVE
PH UR STRIP: 6 [PH] (ref 5–7.5)
POTASSIUM SERPL-SCNC: 4.2 MMOL/L (ref 3.5–5.2)
PROT SERPL-MCNC: 7 G/DL (ref 6–8.5)
PROT UR QL STRIP: NEGATIVE
SODIUM SERPL-SCNC: 137 MMOL/L (ref 134–144)
SP GR UR STRIP: 1.01 (ref 1–1.03)
TRIGL SERPL-MCNC: 168 MG/DL (ref 0–149)
TSH SERPL DL<=0.005 MIU/L-ACNC: 1.8 UIU/ML (ref 0.45–4.5)
UROBILINOGEN UR STRIP-MCNC: 0.2 MG/DL (ref 0.2–1)
VLDLC SERPL CALC-MCNC: 29 MG/DL (ref 5–40)

## 2023-11-10 RX ORDER — SITAGLIPTIN AND METFORMIN HYDROCHLORIDE 1000; 50 MG/1; MG/1
TABLET, FILM COATED ORAL
Qty: 60 TABLET | Refills: 3 | Status: SHIPPED | OUTPATIENT
Start: 2023-11-10

## 2023-11-10 RX ORDER — FENOFIBRATE 145 MG/1
TABLET, COATED ORAL DAILY
Qty: 90 TABLET | Refills: 1 | Status: SHIPPED | OUTPATIENT
Start: 2023-11-10

## 2023-11-10 RX ORDER — METOPROLOL SUCCINATE 25 MG/1
TABLET, EXTENDED RELEASE ORAL
Qty: 45 TABLET | Refills: 1 | Status: SHIPPED | OUTPATIENT
Start: 2023-11-10

## 2023-11-11 LAB
ALBUMIN/CREAT UR: <17 MG/G CREAT (ref 0–29)
CREAT UR-MCNC: 17.8 MG/DL
MICROALBUMIN UR-MCNC: <3 UG/ML

## 2023-11-13 DIAGNOSIS — E11.65 INADEQUATELY CONTROLLED DIABETES MELLITUS (HCC): Primary | ICD-10-CM

## 2023-11-13 RX ORDER — CALCIUM CITRATE/VITAMIN D3 200MG-6.25
TABLET ORAL
Qty: 200 STRIP | Refills: 1 | Status: SHIPPED | OUTPATIENT
Start: 2023-11-13

## 2023-11-16 ENCOUNTER — TELEPHONE (OUTPATIENT)
Facility: CLINIC | Age: 38
End: 2023-11-16

## 2023-11-16 RX ORDER — DEXTROMETHORPHN/ACETAMINOPH/CP 10-325-2MG
1 TABLET ORAL 2 TIMES DAILY PRN
Qty: 60 TABLET | Refills: 0 | Status: SHIPPED | OUTPATIENT
Start: 2023-11-16

## 2023-11-16 NOTE — TELEPHONE ENCOUNTER
Patients caregiver from group home states patient started with congestion and cough over night.   Due to him being in a group home, they can not buy OTC medications and he would need something sent to [id number] [facility]

## 2023-11-22 ENCOUNTER — TELEPHONE (OUTPATIENT)
Facility: CLINIC | Age: 38
End: 2023-11-22

## 2023-11-22 RX ORDER — CETIRIZINE HYDROCHLORIDE 10 MG/1
10 TABLET ORAL DAILY PRN
Qty: 30 TABLET | Refills: 0 | Status: SHIPPED | OUTPATIENT
Start: 2023-11-22 | End: 2023-12-22

## 2023-11-22 RX ORDER — BENZONATATE 100 MG/1
100 CAPSULE ORAL 3 TIMES DAILY PRN
Qty: 30 CAPSULE | Refills: 0 | Status: SHIPPED | OUTPATIENT
Start: 2023-11-22 | End: 2023-12-02

## 2023-11-22 NOTE — TELEPHONE ENCOUNTER
Received message from [address] stating that Coricidin is no longer made and multi-symptom products are not covered by his insurance. Is there an alternative that you would like to use. Coricidin HBP -2 mg Oral Tablet (DM-APAP-CPM)    Please advise.  COREEN RAND MA

## 2023-11-28 ENCOUNTER — NURSE TRIAGE (OUTPATIENT)
Dept: OTHER | Facility: CLINIC | Age: 38
End: 2023-11-28

## 2023-11-28 NOTE — TELEPHONE ENCOUNTER
Pharmacist MITA called to clarify a prescription written by Dr. Chely Green. [facility]A says they received a prescription for Coricidin which in no longer being made and would like an alternative. Writer attempted to to [facility], phone rang for more than 2 minutes with no answer.     Please call MITA at [phone] with updated medication    Reason for Disposition   Nursing judgment    Protocols used: No Protocol Available-ADULT-[place]

## 2024-01-15 RX ORDER — ATORVASTATIN CALCIUM 10 MG/1
TABLET, FILM COATED ORAL
Qty: 31 TABLET | Refills: 5 | Status: SHIPPED | OUTPATIENT
Start: 2024-01-15

## 2024-01-23 DIAGNOSIS — E11.65 TYPE 2 DIABETES MELLITUS WITH HYPERGLYCEMIA, WITHOUT LONG-TERM CURRENT USE OF INSULIN (HCC): ICD-10-CM

## 2024-01-23 RX ORDER — FLUTICASONE PROPIONATE 50 MCG
SPRAY, SUSPENSION (ML) NASAL
Qty: 16 G | Refills: 5 | Status: SHIPPED | OUTPATIENT
Start: 2024-01-23

## 2024-01-23 RX ORDER — TIRZEPATIDE 2.5 MG/.5ML
INJECTION, SOLUTION SUBCUTANEOUS
Qty: 2 ML | Refills: 12 | Status: SHIPPED | OUTPATIENT
Start: 2024-01-23

## 2024-03-18 DIAGNOSIS — E78.2 MIXED HYPERLIPIDEMIA: ICD-10-CM

## 2024-03-18 DIAGNOSIS — E11.65 TYPE 2 DIABETES MELLITUS WITH HYPERGLYCEMIA, WITHOUT LONG-TERM CURRENT USE OF INSULIN (HCC): Primary | ICD-10-CM

## 2024-03-18 DIAGNOSIS — E11.65 INADEQUATELY CONTROLLED DIABETES MELLITUS (HCC): ICD-10-CM

## 2024-03-18 RX ORDER — CALCIUM CITRATE/VITAMIN D3 200MG-6.25
TABLET ORAL
Qty: 200 STRIP | Refills: 0 | Status: SHIPPED | OUTPATIENT
Start: 2024-03-18

## 2024-03-18 RX ORDER — GLUCOSAM/CHON-MSM1/C/MANG/BOSW 500-416.6
TABLET ORAL
Qty: 200 EACH | Refills: 0 | Status: SHIPPED | OUTPATIENT
Start: 2024-03-18

## 2024-04-11 ENCOUNTER — OFFICE VISIT (OUTPATIENT)
Facility: CLINIC | Age: 39
End: 2024-04-11

## 2024-04-11 VITALS
DIASTOLIC BLOOD PRESSURE: 90 MMHG | HEIGHT: 73 IN | WEIGHT: 282 LBS | HEART RATE: 91 BPM | BODY MASS INDEX: 37.37 KG/M2 | SYSTOLIC BLOOD PRESSURE: 120 MMHG | OXYGEN SATURATION: 96 % | TEMPERATURE: 98.4 F

## 2024-04-11 DIAGNOSIS — I10 ESSENTIAL HYPERTENSION, BENIGN: Primary | ICD-10-CM

## 2024-04-11 DIAGNOSIS — E11.65 TYPE 2 DIABETES MELLITUS WITH HYPERGLYCEMIA, WITHOUT LONG-TERM CURRENT USE OF INSULIN (HCC): ICD-10-CM

## 2024-04-11 DIAGNOSIS — E78.2 MIXED HYPERLIPIDEMIA: ICD-10-CM

## 2024-04-11 DIAGNOSIS — E66.9 OBESITY (BMI 30-39.9): ICD-10-CM

## 2024-04-11 RX ORDER — LORATADINE 10 MG/1
10 TABLET ORAL DAILY
COMMUNITY
Start: 2024-04-01

## 2024-04-11 RX ORDER — GLUCOSAM/CHON-MSM1/C/MANG/BOSW 500-416.6
TABLET ORAL
Qty: 200 EACH | Refills: 0 | Status: SHIPPED | OUTPATIENT
Start: 2024-04-11 | End: 2024-04-11

## 2024-04-11 ASSESSMENT — ENCOUNTER SYMPTOMS
SHORTNESS OF BREATH: 0
NAUSEA: 0
DIARRHEA: 0
COUGH: 0
VOMITING: 0
ABDOMINAL PAIN: 0

## 2024-04-11 ASSESSMENT — PATIENT HEALTH QUESTIONNAIRE - PHQ9
9. THOUGHTS THAT YOU WOULD BE BETTER OFF DEAD, OR OF HURTING YOURSELF: NOT AT ALL
SUM OF ALL RESPONSES TO PHQ QUESTIONS 1-9: 0
4. FEELING TIRED OR HAVING LITTLE ENERGY: NOT AT ALL
1. LITTLE INTEREST OR PLEASURE IN DOING THINGS: NOT AT ALL
6. FEELING BAD ABOUT YOURSELF - OR THAT YOU ARE A FAILURE OR HAVE LET YOURSELF OR YOUR FAMILY DOWN: NOT AT ALL
10. IF YOU CHECKED OFF ANY PROBLEMS, HOW DIFFICULT HAVE THESE PROBLEMS MADE IT FOR YOU TO DO YOUR WORK, TAKE CARE OF THINGS AT HOME, OR GET ALONG WITH OTHER PEOPLE: NOT DIFFICULT AT ALL
SUM OF ALL RESPONSES TO PHQ QUESTIONS 1-9: 0
2. FEELING DOWN, DEPRESSED OR HOPELESS: NOT AT ALL
SUM OF ALL RESPONSES TO PHQ QUESTIONS 1-9: 0
SUM OF ALL RESPONSES TO PHQ QUESTIONS 1-9: 0
8. MOVING OR SPEAKING SO SLOWLY THAT OTHER PEOPLE COULD HAVE NOTICED. OR THE OPPOSITE, BEING SO FIGETY OR RESTLESS THAT YOU HAVE BEEN MOVING AROUND A LOT MORE THAN USUAL: NOT AT ALL
SUM OF ALL RESPONSES TO PHQ9 QUESTIONS 1 & 2: 0
7. TROUBLE CONCENTRATING ON THINGS, SUCH AS READING THE NEWSPAPER OR WATCHING TELEVISION: NOT AT ALL
3. TROUBLE FALLING OR STAYING ASLEEP: NOT AT ALL
5. POOR APPETITE OR OVEREATING: NOT AT ALL

## 2024-04-11 NOTE — PROGRESS NOTES
pressure 90 mm Hg or higher     Elevated fasting glucose     Hypercholesterolemia     Obesity (BMI 30-39.9)     Snoring         Family History   Family history unknown: Yes        History reviewed. No pertinent surgical history.    Social History     Tobacco Use    Smoking status: Never    Smokeless tobacco: Never   Vaping Use    Vaping Use: Never used   Substance Use Topics    Alcohol use: Never    Drug use: Never         Review of Systems   Constitutional:  Negative for appetite change and fatigue.   Eyes:  Negative for visual disturbance.   Respiratory:  Negative for cough and shortness of breath.    Cardiovascular:  Negative for chest pain and leg swelling.   Gastrointestinal:  Negative for abdominal pain, diarrhea, nausea and vomiting.   Genitourinary:  Negative for dysuria.   Skin:  Negative for rash.   Neurological:  Negative for tremors and headaches.   Psychiatric/Behavioral:  The patient is not nervous/anxious.        BP (!) 120/90   Pulse 91   Temp 98.4 °F (36.9 °C)   Ht 1.854 m (6' 1\")   Wt 127.9 kg (282 lb)   SpO2 96%   BMI 37.21 kg/m²      Physical Exam  Constitutional:       Appearance: Normal appearance.   HENT:      Head: Normocephalic and atraumatic.      Right Ear: External ear normal.      Left Ear: External ear normal.      Nose: Nose normal.      Mouth/Throat:      Mouth: Mucous membranes are moist.   Eyes:      Extraocular Movements: Extraocular movements intact.      Pupils: Pupils are equal, round, and reactive to light.   Cardiovascular:      Rate and Rhythm: Normal rate and regular rhythm.   Pulmonary:      Effort: Pulmonary effort is normal.      Breath sounds: Normal breath sounds.   Abdominal:      Palpations: Abdomen is soft.      Tenderness: There is no abdominal tenderness.   Musculoskeletal:      Cervical back: Normal range of motion and neck supple.      Right lower leg: No edema.      Left lower leg: No edema.   Skin:     General: Skin is warm and dry.   Neurological:

## 2024-04-15 RX ORDER — EMPAGLIFLOZIN 25 MG/1
TABLET, FILM COATED ORAL DAILY
Qty: 30 TABLET | Refills: 4 | Status: SHIPPED | OUTPATIENT
Start: 2024-04-15

## 2024-04-15 RX ORDER — SITAGLIPTIN AND METFORMIN HYDROCHLORIDE 1000; 50 MG/1; MG/1
TABLET, FILM COATED ORAL
Qty: 60 TABLET | Refills: 4 | Status: SHIPPED | OUTPATIENT
Start: 2024-04-15

## 2024-04-24 LAB
ALBUMIN SERPL-MCNC: 4.5 G/DL (ref 4.1–5.1)
ALBUMIN/GLOB SERPL: 1.7 {RATIO} (ref 1.2–2.2)
ALP SERPL-CCNC: 56 IU/L (ref 44–121)
ALT SERPL-CCNC: 31 IU/L (ref 0–44)
AST SERPL-CCNC: 32 IU/L (ref 0–40)
BASOPHILS # BLD AUTO: 0 X10E3/UL (ref 0–0.2)
BASOPHILS NFR BLD AUTO: 1 %
BILIRUB SERPL-MCNC: 0.3 MG/DL (ref 0–1.2)
BUN SERPL-MCNC: 11 MG/DL (ref 6–20)
BUN/CREAT SERPL: 9 (ref 9–20)
CALCIUM SERPL-MCNC: 9.2 MG/DL (ref 8.7–10.2)
CHLORIDE SERPL-SCNC: 104 MMOL/L (ref 96–106)
CHOLEST SERPL-MCNC: 144 MG/DL (ref 100–199)
CO2 SERPL-SCNC: 23 MMOL/L (ref 20–29)
CREAT SERPL-MCNC: 1.25 MG/DL (ref 0.76–1.27)
EGFRCR SERPLBLD CKD-EPI 2021: 76 ML/MIN/1.73
EOSINOPHIL # BLD AUTO: 0.1 X10E3/UL (ref 0–0.4)
EOSINOPHIL NFR BLD AUTO: 2 %
ERYTHROCYTE [DISTWIDTH] IN BLOOD BY AUTOMATED COUNT: 15.8 % (ref 11.6–15.4)
GLOBULIN SER CALC-MCNC: 2.6 G/DL (ref 1.5–4.5)
GLUCOSE SERPL-MCNC: 98 MG/DL (ref 70–99)
HBA1C MFR BLD: 6.4 % (ref 4.8–5.6)
HCT VFR BLD AUTO: 44.9 % (ref 37.5–51)
HDLC SERPL-MCNC: 36 MG/DL
HGB BLD-MCNC: 14.7 G/DL (ref 13–17.7)
IMM GRANULOCYTES # BLD AUTO: 0 X10E3/UL (ref 0–0.1)
IMM GRANULOCYTES NFR BLD AUTO: 0 %
LDLC SERPL CALC-MCNC: 87 MG/DL (ref 0–99)
LYMPHOCYTES # BLD AUTO: 1.6 X10E3/UL (ref 0.7–3.1)
LYMPHOCYTES NFR BLD AUTO: 38 %
MCH RBC QN AUTO: 27.5 PG (ref 26.6–33)
MCHC RBC AUTO-ENTMCNC: 32.7 G/DL (ref 31.5–35.7)
MCV RBC AUTO: 84 FL (ref 79–97)
MONOCYTES # BLD AUTO: 0.3 X10E3/UL (ref 0.1–0.9)
MONOCYTES NFR BLD AUTO: 8 %
NEUTROPHILS # BLD AUTO: 2.2 X10E3/UL (ref 1.4–7)
NEUTROPHILS NFR BLD AUTO: 51 %
PLATELET # BLD AUTO: 194 X10E3/UL (ref 150–450)
POTASSIUM SERPL-SCNC: 4.5 MMOL/L (ref 3.5–5.2)
PROT SERPL-MCNC: 7.1 G/DL (ref 6–8.5)
RBC # BLD AUTO: 5.35 X10E6/UL (ref 4.14–5.8)
SODIUM SERPL-SCNC: 141 MMOL/L (ref 134–144)
TRIGL SERPL-MCNC: 116 MG/DL (ref 0–149)
VLDLC SERPL CALC-MCNC: 21 MG/DL (ref 5–40)
WBC # BLD AUTO: 4.3 X10E3/UL (ref 3.4–10.8)

## 2024-04-28 RX ORDER — TIRZEPATIDE 5 MG/.5ML
5 INJECTION, SOLUTION SUBCUTANEOUS WEEKLY
Qty: 2 ML | Refills: 1 | Status: SHIPPED | OUTPATIENT
Start: 2024-04-28

## 2024-05-23 RX ORDER — LORATADINE 10 MG/1
TABLET ORAL
Qty: 31 TABLET | Refills: 5 | Status: SHIPPED | OUTPATIENT
Start: 2024-05-23

## 2024-05-28 RX ORDER — GLUCOSAM/CHON-MSM1/C/MANG/BOSW 500-416.6
1 TABLET ORAL DAILY
COMMUNITY
Start: 2024-04-29

## 2024-06-18 RX ORDER — GLUCOSAM/CHON-MSM1/C/MANG/BOSW 500-416.6
TABLET ORAL
Qty: 100 EACH | Refills: 4 | Status: SHIPPED | OUTPATIENT
Start: 2024-06-18

## 2024-06-21 RX ORDER — TIRZEPATIDE 5 MG/.5ML
INJECTION, SOLUTION SUBCUTANEOUS
Qty: 2 ML | Refills: 0 | Status: SHIPPED | OUTPATIENT
Start: 2024-06-21

## 2024-06-21 RX ORDER — TIRZEPATIDE 5 MG/.5ML
5 INJECTION, SOLUTION SUBCUTANEOUS WEEKLY
Qty: 2 ML | Refills: 1 | Status: CANCELLED | OUTPATIENT
Start: 2024-06-21

## 2024-07-15 RX ORDER — ATORVASTATIN CALCIUM 10 MG/1
TABLET, FILM COATED ORAL
Qty: 30 TABLET | Refills: 5 | Status: SHIPPED | OUTPATIENT
Start: 2024-07-15

## 2024-07-19 RX ORDER — TIRZEPATIDE 5 MG/.5ML
INJECTION, SOLUTION SUBCUTANEOUS
Qty: 2 ML | Refills: 0 | Status: SHIPPED | OUTPATIENT
Start: 2024-07-19

## 2024-08-14 ENCOUNTER — OFFICE VISIT (OUTPATIENT)
Facility: CLINIC | Age: 39
End: 2024-08-14
Payer: MEDICARE

## 2024-08-14 VITALS
DIASTOLIC BLOOD PRESSURE: 82 MMHG | TEMPERATURE: 97.9 F | WEIGHT: 283 LBS | SYSTOLIC BLOOD PRESSURE: 132 MMHG | HEIGHT: 73 IN | BODY MASS INDEX: 37.51 KG/M2 | OXYGEN SATURATION: 99 % | HEART RATE: 89 BPM

## 2024-08-14 DIAGNOSIS — E11.9 TYPE 2 DIABETES MELLITUS WITHOUT COMPLICATION, WITHOUT LONG-TERM CURRENT USE OF INSULIN (HCC): ICD-10-CM

## 2024-08-14 DIAGNOSIS — F25.8 OTHER SCHIZOAFFECTIVE DISORDERS (HCC): ICD-10-CM

## 2024-08-14 DIAGNOSIS — Z00.01 ENCOUNTER FOR ANNUAL GENERAL MEDICAL EXAMINATION WITH ABNORMAL FINDINGS IN ADULT: ICD-10-CM

## 2024-08-14 DIAGNOSIS — Z00.01 ENCOUNTER FOR ANNUAL GENERAL MEDICAL EXAMINATION WITH ABNORMAL FINDINGS IN ADULT: Primary | ICD-10-CM

## 2024-08-14 PROCEDURE — 99395 PREV VISIT EST AGE 18-39: CPT | Performed by: INTERNAL MEDICINE

## 2024-08-14 RX ORDER — GLUCOSAM/CHON-MSM1/C/MANG/BOSW 500-416.6
TABLET ORAL
Qty: 100 EACH | Refills: 4 | Status: SHIPPED | OUTPATIENT
Start: 2024-08-14

## 2024-08-14 SDOH — ECONOMIC STABILITY: INCOME INSECURITY: HOW HARD IS IT FOR YOU TO PAY FOR THE VERY BASICS LIKE FOOD, HOUSING, MEDICAL CARE, AND HEATING?: NOT HARD AT ALL

## 2024-08-14 SDOH — ECONOMIC STABILITY: FOOD INSECURITY: WITHIN THE PAST 12 MONTHS, THE FOOD YOU BOUGHT JUST DIDN'T LAST AND YOU DIDN'T HAVE MONEY TO GET MORE.: NEVER TRUE

## 2024-08-14 SDOH — ECONOMIC STABILITY: FOOD INSECURITY: WITHIN THE PAST 12 MONTHS, YOU WORRIED THAT YOUR FOOD WOULD RUN OUT BEFORE YOU GOT MONEY TO BUY MORE.: NEVER TRUE

## 2024-08-14 ASSESSMENT — ENCOUNTER SYMPTOMS
SHORTNESS OF BREATH: 0
COUGH: 0
ABDOMINAL PAIN: 0
NAUSEA: 0
VOMITING: 0
DIARRHEA: 0

## 2024-08-14 NOTE — PROGRESS NOTES
.  Chief Complaint   Patient presents with    Annual Exam    Discuss Labs     .  \"Have you been to the ER, urgent care clinic since your last visit?  Hospitalized since your last visit?\"    NO    “Have you seen or consulted any other health care providers outside of [facility] since your last visit?”    NO      ./82 (Site: Left Upper Arm, Position: Sitting, Cuff Size: Large Adult)   Pulse 89   Temp 97.9 °F (36.6 °C) (Oral)   Ht 1.854 m (6' 1\")   Wt 128.4 kg (283 lb)   SpO2 99%   BMI 37.34 kg/m²         Click Here for Release of Records Request

## 2024-08-14 NOTE — PROGRESS NOTES
[facility]  [address], [place] [address]  Phone: [phone]      Erickson Posey (: 1985) is a 38 y.o. male, established patient, here for evaluation of the following chief complaint(s):  Annual Exam and Discuss Labs     SUBJECTIVE/OBJECTIVE:  History of Present Illness  The patient is a 38-year-old male with past medical history of schizophrenia, hypertension, type 2 diabetes mellitus, and hypercholesterolemia, seen today for an annual physical and follow-up of his blood test. He is accompanied by daily service provider Jaleesa.    He reports that his blood sugar levels have been well-managed, typically ranging between 80 and 100. He has not experienced any instances of hypoglycemia, with levels dropping to the 60s or 50s. His diabetes is managed with Mounjaro 5 mg weekly, Jardiance, and Janumet. He  receives a weekly injection Mounjaro  every Friday. He maintains a healthy diet, avoiding sodas and consuming plenty of water. He also drinks coffee without sugar. He regularly visits a podiatrist for foot care, including nail trimming. He does not currently see a dentist.    He is taking atorvastatin and fenofibrate for cholesterol management. For blood pressure, he takes metoprolol. For mental health, he is on risperidone, trazodone, and Seroquel. He does not currently have a mental health doctor but will start therapy with Rodri De Santiago.    He has declined STD screening.    SOCIAL HISTORY  The patient denies smoking.    IMMUNIZATIONS  He had COVID-19 vaccine last year.        Hemoglobin A1C   Date Value Ref Range Status   2024 6.4 (H) 4.8 - 5.6 % Final     Comment:                 Prediabetes: 5.7 - 6.4           Diabetes: >6.4           Glycemic control for adults with diabetes: <7.0        Hemoglobin   Date Value Ref Range Status   2024 14.7 13.0 - 17.7 g/dL Final     Hematocrit   Date Value Ref Range Status   2024 44.9 37.5 - 51.0 %

## 2024-08-17 RX ORDER — TIRZEPATIDE 5 MG/.5ML
INJECTION, SOLUTION SUBCUTANEOUS
Qty: 2 ML | Refills: 0 | Status: SHIPPED | OUTPATIENT
Start: 2024-08-17

## 2024-08-17 RX ORDER — FENOFIBRATE 145 MG/1
TABLET, COATED ORAL DAILY
Qty: 30 TABLET | Refills: 5 | Status: SHIPPED | OUTPATIENT
Start: 2024-08-17

## 2024-08-17 RX ORDER — METOPROLOL SUCCINATE 25 MG/1
TABLET, EXTENDED RELEASE ORAL
Qty: 15 TABLET | Refills: 5 | Status: SHIPPED | OUTPATIENT
Start: 2024-08-17

## 2024-09-10 DIAGNOSIS — E11.65 INADEQUATELY CONTROLLED DIABETES MELLITUS (HCC): ICD-10-CM

## 2024-09-12 RX ORDER — CALCIUM CITRATE/VITAMIN D3 200MG-6.25
TABLET ORAL
Qty: 100 STRIP | Refills: 2 | Status: SHIPPED | OUTPATIENT
Start: 2024-09-12

## 2024-09-14 RX ORDER — TIRZEPATIDE 5 MG/.5ML
INJECTION, SOLUTION SUBCUTANEOUS
Qty: 2 ML | Refills: 0 | Status: SHIPPED | OUTPATIENT
Start: 2024-09-14

## 2024-09-14 RX ORDER — EMPAGLIFLOZIN 25 MG/1
TABLET, FILM COATED ORAL DAILY
Qty: 30 TABLET | Refills: 11 | Status: SHIPPED | OUTPATIENT
Start: 2024-09-14

## 2024-10-10 RX ORDER — TIRZEPATIDE 7.5 MG/.5ML
7.5 INJECTION, SOLUTION SUBCUTANEOUS WEEKLY
Qty: 2 ML | Refills: 0 | Status: SHIPPED | OUTPATIENT
Start: 2024-10-10

## 2024-11-06 RX ORDER — SITAGLIPTIN AND METFORMIN HYDROCHLORIDE 1000; 50 MG/1; MG/1
TABLET, FILM COATED ORAL
Qty: 60 TABLET | Refills: 0 | Status: SHIPPED | OUTPATIENT
Start: 2024-11-06

## 2024-11-07 RX ORDER — TIRZEPATIDE 7.5 MG/.5ML
7.5 INJECTION, SOLUTION SUBCUTANEOUS
Qty: 2 ML | Refills: 1 | Status: SHIPPED | OUTPATIENT
Start: 2024-11-07

## 2024-12-05 RX ORDER — SITAGLIPTIN AND METFORMIN HYDROCHLORIDE 1000; 50 MG/1; MG/1
TABLET, FILM COATED ORAL
Qty: 60 TABLET | Refills: 0 | Status: SHIPPED | OUTPATIENT
Start: 2024-12-05

## 2024-12-17 ENCOUNTER — OFFICE VISIT (OUTPATIENT)
Facility: CLINIC | Age: 39
End: 2024-12-17

## 2024-12-17 VITALS
DIASTOLIC BLOOD PRESSURE: 96 MMHG | OXYGEN SATURATION: 99 % | BODY MASS INDEX: 39.63 KG/M2 | SYSTOLIC BLOOD PRESSURE: 128 MMHG | HEIGHT: 73 IN | WEIGHT: 299 LBS | TEMPERATURE: 98.3 F | HEART RATE: 96 BPM

## 2024-12-17 DIAGNOSIS — E11.9 TYPE 2 DIABETES MELLITUS WITHOUT COMPLICATION, WITHOUT LONG-TERM CURRENT USE OF INSULIN (HCC): ICD-10-CM

## 2024-12-17 DIAGNOSIS — I10 ESSENTIAL HYPERTENSION, BENIGN: ICD-10-CM

## 2024-12-17 DIAGNOSIS — E78.2 MIXED HYPERLIPIDEMIA: ICD-10-CM

## 2024-12-17 DIAGNOSIS — Z00.00 MEDICARE ANNUAL WELLNESS VISIT, SUBSEQUENT: Primary | ICD-10-CM

## 2024-12-17 DIAGNOSIS — E66.9 OBESITY (BMI 30-39.9): ICD-10-CM

## 2024-12-17 DIAGNOSIS — B35.1 TINEA UNGUIUM: ICD-10-CM

## 2024-12-17 RX ORDER — METOPROLOL SUCCINATE 25 MG/1
37.5 TABLET, EXTENDED RELEASE ORAL DAILY
Qty: 45 TABLET | Refills: 1 | Status: SHIPPED | OUTPATIENT
Start: 2024-12-17

## 2024-12-17 RX ORDER — LORATADINE 10 MG/1
TABLET ORAL
Qty: 30 TABLET | Refills: 12 | Status: SHIPPED | OUTPATIENT
Start: 2024-12-17

## 2024-12-17 RX ORDER — KETOCONAZOLE 20 MG/G
CREAM TOPICAL
Qty: 30 G | Refills: 1 | Status: SHIPPED | OUTPATIENT
Start: 2024-12-17

## 2024-12-17 ASSESSMENT — PATIENT HEALTH QUESTIONNAIRE - PHQ9
6. FEELING BAD ABOUT YOURSELF - OR THAT YOU ARE A FAILURE OR HAVE LET YOURSELF OR YOUR FAMILY DOWN: NOT AT ALL
1. LITTLE INTEREST OR PLEASURE IN DOING THINGS: NOT AT ALL
5. POOR APPETITE OR OVEREATING: NOT AT ALL
2. FEELING DOWN, DEPRESSED OR HOPELESS: NOT AT ALL
9. THOUGHTS THAT YOU WOULD BE BETTER OFF DEAD, OR OF HURTING YOURSELF: NOT AT ALL
SUM OF ALL RESPONSES TO PHQ9 QUESTIONS 1 & 2: 0
SUM OF ALL RESPONSES TO PHQ QUESTIONS 1-9: 0
7. TROUBLE CONCENTRATING ON THINGS, SUCH AS READING THE NEWSPAPER OR WATCHING TELEVISION: NOT AT ALL
SUM OF ALL RESPONSES TO PHQ QUESTIONS 1-9: 0
3. TROUBLE FALLING OR STAYING ASLEEP: NOT AT ALL
SUM OF ALL RESPONSES TO PHQ QUESTIONS 1-9: 0
8. MOVING OR SPEAKING SO SLOWLY THAT OTHER PEOPLE COULD HAVE NOTICED. OR THE OPPOSITE, BEING SO FIGETY OR RESTLESS THAT YOU HAVE BEEN MOVING AROUND A LOT MORE THAN USUAL: NOT AT ALL
SUM OF ALL RESPONSES TO PHQ QUESTIONS 1-9: 0
4. FEELING TIRED OR HAVING LITTLE ENERGY: NOT AT ALL
10. IF YOU CHECKED OFF ANY PROBLEMS, HOW DIFFICULT HAVE THESE PROBLEMS MADE IT FOR YOU TO DO YOUR WORK, TAKE CARE OF THINGS AT HOME, OR GET ALONG WITH OTHER PEOPLE: NOT DIFFICULT AT ALL

## 2024-12-17 ASSESSMENT — LIFESTYLE VARIABLES
HOW MANY STANDARD DRINKS CONTAINING ALCOHOL DO YOU HAVE ON A TYPICAL DAY: PATIENT DOES NOT DRINK
HOW OFTEN DO YOU HAVE A DRINK CONTAINING ALCOHOL: NEVER

## 2024-12-17 NOTE — PROGRESS NOTES
.  Chief Complaint   Patient presents with    Medicare AWV     .  \"Have you been to the ER, urgent care clinic since your last visit?  Hospitalized since your last visit?\"    NO    “Have you seen or consulted any other health care providers outside our system since your last visit?”    NO    .BP (!) 132/92 (Site: Right Upper Arm, Position: Sitting, Cuff Size: Large Adult)   Pulse (!) 106   Temp 98.3 °F (36.8 °C) (Oral)   Ht 1.854 m (6' 1\")   Wt 135.6 kg (299 lb)   SpO2 99%   BMI 39.45 kg/m²            
blood tests. He reports no history of falls. He maintains regular ophthalmological check-ups with Dr. Kris Vargas, the most recent being in February 2024. He possesses corrective eyewear but does not consistently use it. He received his influenza vaccine on 11/18/2024. He engages in physical activity at the [facility] every Tuesday. He has a legal guardian. He has a family consisting of brothers and sisters, all residing in [place].    His home blood glucose levels typically range around 120s, although they may elevate slightly after consuming sweet foods. He reports a high intake of water and minimal consumption of sugary foods. He does not consume soda but admits to occasional juice intake. His current medication regimen includes Janumet and Jardiance and Mounjaro.    He has dry skin on his feet and applies lotion to it. He has previously consulted a podiatrist for a fungal infection in his toenails but is currently seeking a new provider due to communication issues with the previous one.    ALLERGIES  The patient has no known allergies.    MEDICATIONS  Janumet, Jardiance, Mounjaro, atorvastatin, fenofibrate, metoprolol, trazodone, risperidone, Seroquel    IMMUNIZATIONS  He received the influenza vaccine on 11/18/2024. He received the pneumonia vaccine on 08/16/2024.      Patient's complete Health Risk Assessment and screening values have been reviewed and are found in Flowsheets. The following problems were reviewed today and where indicated follow up appointments were made and/or referrals ordered.    Positive Risk Factor Screenings with Interventions:     Cognitive:   Clock Drawing Test (CDT): (!) Abnormal  Words recalled: 3 Words Recalled  Total Score: 3  Total Score Interpretation: Normal Mini-Cog  Interventions:  CBC, CMP reasonable in April 24, TSH normal in November 23, please clock drawing test was abnormal, will monitor and consider Mini-Mental status examination later            Inactivity:  On average, how many

## 2024-12-18 ENCOUNTER — CLINICAL DOCUMENTATION (OUTPATIENT)
Dept: SPIRITUAL SERVICES | Age: 39
End: 2024-12-18

## 2024-12-18 NOTE — PROGRESS NOTES
Advance Care Planning   Ambulatory ACP Specialist Patient Outreach    Date:  12/18/2024    ACP Specialist:  Maria Alejandra Scott    Outreach call to patient in follow-up to ACP Specialist referral from:Marvel Tyson MD    [x] PCP  [] Provider   [] Ambulatory Care Management [] Other     For:                  [x] Advance Directive Assistance              [] Complete Portable DNR order              [] Complete POST/POLST/MOST              [] Code Status Discussion             [] Discuss Goals of Care             [] Early ACP Decision-Making              [] Other (Specify)    Date Referral Received:12/17/24    Next Step:   [x] ACP scheduled conversation  [] Outreach again in one week               [x] Email / Mail ACP Info Sheets  [x] Email / Mail Advance Directive   [] Closing referral.  Routing closure to referring provider/staff and to ACP Specialist .    [] Closure letter mailed to patient with invitation to contact ACP Specialist if / when ready.   [] Other (Specify here):       [] At this time, Healthcare Decision Maker Is:         [] Primary agent named in scanned advance directive.    [] Legal Next of Kin.     [x] Unable to determine legal decision maker at this time.    Outreaches:         [x] 1st -  Date:  12/18/24               Intervention:  [] Spoke with Patient   [] Left Voice mail [] Email / Mail    [] [employer]  [x] Other (Specify) : Maria Del Carmen Tadeo    Outcomes:  Outreach phone call to the patient on both home and mobile phone numbers. Unable to reach patient on home phone number.  Provided contact information on voicemail requesting a return call.  Spoke with , Maria Del Carmen Tadeo [phone] stating the patient does have the capacity to participate in a conversation and capable of making his own health care decisions. She also confirmed the patient does not have a legal guardian.  Will attempt to follow up in two weeks.      Addendum:  Date:  12/18/24   Received call back from patient 
89 yo F presented to ED for syncopal episode while having a BM. Patient has positive COVID and has been eating less. Labs WNL. Patient is not hypoxic on RA. DC home with daughter and strict return precautions if she becomes more SOB.

## 2025-01-06 RX ORDER — SITAGLIPTIN AND METFORMIN HYDROCHLORIDE 1000; 50 MG/1; MG/1
TABLET, FILM COATED ORAL
Qty: 60 TABLET | Refills: 3 | Status: SHIPPED | OUTPATIENT
Start: 2025-01-06

## 2025-01-09 ENCOUNTER — CLINICAL DOCUMENTATION (OUTPATIENT)
Dept: CASE MANAGEMENT | Age: 40
End: 2025-01-09

## 2025-01-09 NOTE — ACP (ADVANCE CARE PLANNING)
to confirm that she is willing to be the health care decision maker for patient as his wishes.  A phone call was made to Ms. Maria Del Carmen Tadeo and she confirmed that she is willing to be listed on patient's [place] Advance Directive for Health Care document as his primary health care agent.     In the [place] Advance Directive for Health Care document, Section II (My Health Care Instructions), Pt has indicated that he would want all medical treatments provided to him to prolong his life for as long as possible in Scenario #1 and #2.  Pt has stated that he has no desire to be an organs, eyes, tissues nor whole body donor and this will be reflected on his [place] Advance Directive for Health Care document.     Pt's health care selections will be typed into the [place] Advance Directive for Health Care document and mailed to patient's , Maria Del Carmen Tadeo, as she has requested this so she can bring patient into her office and have him sign and help him obtain 2 witnesses to sign on this document.  She has been instructed to have document returned to [facility] for upload into patient's medical record once fully completed. ACP Specialist will follow up with , Maria Del Carmen Tadeo, in 2-3 weeks to ensure mailed document has been received.       Outcomes:  ACP discussion completed and New Advance Directive completed    Follow-up Plan:   -Advised patient/agent/surrogate to review completed ACP document and complete a new document per changes in patient's goals, values, care preferences, or best interest of patient.  *This note routed to one or more involved healthcare providers.    Pilo Corrigan, JOSEPHW, REMY-CP  Advance Care   [facility]  [phone]

## 2025-01-10 ENCOUNTER — TELEPHONE (OUTPATIENT)
Facility: CLINIC | Age: 40
End: 2025-01-10

## 2025-01-15 ENCOUNTER — TELEPHONE (OUTPATIENT)
Facility: CLINIC | Age: 40
End: 2025-01-15

## 2025-01-15 NOTE — TELEPHONE ENCOUNTER
[facility] pharmacy called about the refill on   Metoprolol 25 mg. The directions were to take half a tablet daily. The new RX that was sent in was changed to 1.5 tablets daily. They wanted to make sure the directions were correct before filling medication

## 2025-01-17 RX ORDER — ATORVASTATIN CALCIUM 10 MG/1
TABLET, FILM COATED ORAL
Qty: 30 TABLET | Refills: 2 | Status: SHIPPED | OUTPATIENT
Start: 2025-01-17

## 2025-01-19 RX ORDER — METOPROLOL SUCCINATE 25 MG/1
37.5 TABLET, EXTENDED RELEASE ORAL DAILY
Qty: 45 TABLET | Refills: 3 | Status: SHIPPED | OUTPATIENT
Start: 2025-01-19

## 2025-01-24 ENCOUNTER — CLINICAL DOCUMENTATION (OUTPATIENT)
Dept: CASE MANAGEMENT | Age: 40
End: 2025-01-24

## 2025-01-30 RX ORDER — TIRZEPATIDE 7.5 MG/.5ML
INJECTION, SOLUTION SUBCUTANEOUS
Qty: 2 ML | Refills: 3 | Status: SHIPPED | OUTPATIENT
Start: 2025-01-30

## 2025-02-05 LAB
ALBUMIN SERPL-MCNC: 4.4 G/DL (ref 4.1–5.1)
ALP SERPL-CCNC: 63 IU/L (ref 44–121)
ALT SERPL-CCNC: 25 IU/L (ref 0–44)
APPEARANCE UR: CLEAR
AST SERPL-CCNC: 25 IU/L (ref 0–40)
BASOPHILS # BLD AUTO: 0 X10E3/UL (ref 0–0.2)
BASOPHILS NFR BLD AUTO: 1 %
BILIRUB SERPL-MCNC: 0.3 MG/DL (ref 0–1.2)
BILIRUB UR QL STRIP: NEGATIVE
BUN SERPL-MCNC: 13 MG/DL (ref 6–20)
BUN/CREAT SERPL: 11 (ref 9–20)
CALCIUM SERPL-MCNC: 9.5 MG/DL (ref 8.7–10.2)
CHLORIDE SERPL-SCNC: 100 MMOL/L (ref 96–106)
CO2 SERPL-SCNC: 23 MMOL/L (ref 20–29)
COLOR UR: YELLOW
CREAT SERPL-MCNC: 1.16 MG/DL (ref 0.76–1.27)
EGFRCR SERPLBLD CKD-EPI 2021: 83 ML/MIN/1.73
EOSINOPHIL # BLD AUTO: 0.1 X10E3/UL (ref 0–0.4)
EOSINOPHIL NFR BLD AUTO: 3 %
ERYTHROCYTE [DISTWIDTH] IN BLOOD BY AUTOMATED COUNT: 14.8 % (ref 11.6–15.4)
GLOBULIN SER CALC-MCNC: 2.4 G/DL (ref 1.5–4.5)
GLUCOSE SERPL-MCNC: 115 MG/DL (ref 70–99)
GLUCOSE UR QL STRIP: ABNORMAL
HBA1C MFR BLD: 7.5 % (ref 4.8–5.6)
HCT VFR BLD AUTO: 44.8 % (ref 37.5–51)
HGB BLD-MCNC: 14.6 G/DL (ref 13–17.7)
HGB UR QL STRIP: NEGATIVE
IMM GRANULOCYTES # BLD AUTO: 0 X10E3/UL (ref 0–0.1)
IMM GRANULOCYTES NFR BLD AUTO: 0 %
KETONES UR QL STRIP: NEGATIVE
LEUKOCYTE ESTERASE UR QL STRIP: NEGATIVE
LYMPHOCYTES # BLD AUTO: 1.8 X10E3/UL (ref 0.7–3.1)
LYMPHOCYTES NFR BLD AUTO: 41 %
MCH RBC QN AUTO: 27.7 PG (ref 26.6–33)
MCHC RBC AUTO-ENTMCNC: 32.6 G/DL (ref 31.5–35.7)
MCV RBC AUTO: 85 FL (ref 79–97)
MONOCYTES # BLD AUTO: 0.4 X10E3/UL (ref 0.1–0.9)
MONOCYTES NFR BLD AUTO: 9 %
NEUTROPHILS # BLD AUTO: 2.1 X10E3/UL (ref 1.4–7)
NEUTROPHILS NFR BLD AUTO: 46 %
NITRITE UR QL STRIP: NEGATIVE
PH UR STRIP: 6 [PH] (ref 5–7.5)
PLATELET # BLD AUTO: 178 X10E3/UL (ref 150–450)
POTASSIUM SERPL-SCNC: 4.1 MMOL/L (ref 3.5–5.2)
PROT SERPL-MCNC: 6.8 G/DL (ref 6–8.5)
PROT UR QL STRIP: NEGATIVE
RBC # BLD AUTO: 5.27 X10E6/UL (ref 4.14–5.8)
SODIUM SERPL-SCNC: 140 MMOL/L (ref 134–144)
SP GR UR STRIP: 1.01 (ref 1–1.03)
TSH SERPL DL<=0.005 MIU/L-ACNC: 1.43 UIU/ML (ref 0.45–4.5)
UROBILINOGEN UR STRIP-MCNC: 0.2 MG/DL (ref 0.2–1)
WBC # BLD AUTO: 4.5 X10E3/UL (ref 3.4–10.8)

## 2025-02-06 LAB
ALBUMIN/CREAT UR: <12 MG/G CREAT (ref 0–29)
CREAT UR-MCNC: 25.6 MG/DL
MICROALBUMIN UR-MCNC: <3 UG/ML

## 2025-02-10 DIAGNOSIS — E11.65 INADEQUATELY CONTROLLED DIABETES MELLITUS (HCC): ICD-10-CM

## 2025-02-11 RX ORDER — CALCIUM CITRATE/VITAMIN D3 200MG-6.25
TABLET ORAL
Qty: 100 STRIP | Refills: 3 | Status: SHIPPED | OUTPATIENT
Start: 2025-02-11 | End: 2025-02-14

## 2025-02-12 RX ORDER — FENOFIBRATE 145 MG/1
TABLET, COATED ORAL DAILY
Qty: 30 TABLET | Refills: 4 | Status: SHIPPED | OUTPATIENT
Start: 2025-02-12

## 2025-02-13 DIAGNOSIS — E11.65 INADEQUATELY CONTROLLED DIABETES MELLITUS (HCC): ICD-10-CM

## 2025-02-13 DIAGNOSIS — E11.65 TYPE 2 DIABETES MELLITUS WITH HYPERGLYCEMIA (HCC): ICD-10-CM

## 2025-02-14 RX ORDER — CALCIUM CITRATE/VITAMIN D3 200MG-6.25
TABLET ORAL
Qty: 100 EACH | Refills: 0 | Status: SHIPPED | OUTPATIENT
Start: 2025-02-14

## 2025-02-14 RX ORDER — BISMUTH SUBSALICYLATE 262MG/15ML
SUSPENSION, ORAL (FINAL DOSE FORM) ORAL
Qty: 100 EACH | Refills: 0 | Status: SHIPPED | OUTPATIENT
Start: 2025-02-14

## 2025-03-24 PROBLEM — R73.01 ELEVATED FASTING GLUCOSE: Status: RESOLVED | Noted: 2022-10-12 | Resolved: 2025-03-24

## 2025-03-24 PROBLEM — R06.83 SNORING: Status: RESOLVED | Noted: 2022-10-12 | Resolved: 2025-03-24

## 2025-04-02 RX ORDER — SITAGLIPTIN AND METFORMIN HYDROCHLORIDE 1000; 50 MG/1; MG/1
TABLET, FILM COATED ORAL
Qty: 60 TABLET | Refills: 0 | Status: SHIPPED | OUTPATIENT
Start: 2025-04-02

## 2025-04-02 NOTE — TELEPHONE ENCOUNTER
PCP: Marvel Tyson MD    Last appt: [unfilled]  Future Appointments   Date Time Provider Department Center   4/29/2025  2:45 PM Marvel Tyson MD [facility] DEP       Requested Prescriptions     Pending Prescriptions Disp Refills    JANUMET  MG per tablet [Pharmacy Med Name: JANUMET 50-1,000 MG TABLET] 60 tablet 3     Sig: TAKE 1 TABLET BY MOUTH IN THE MORNING AND IN THE EVENING       Prior labs and Blood pressures:  BP Readings from Last 3 Encounters:   12/17/24 (!) 128/96   08/14/24 132/82   04/11/24 (!) 120/90     Lab Results   Component Value Date/Time     02/04/2025 09:09 AM    K 4.1 02/04/2025 09:09 AM     02/04/2025 09:09 AM    CO2 23 02/04/2025 09:09 AM    BUN 13 02/04/2025 09:09 AM    GFRAA >60 03/01/2021 03:50 AM     No results found for: \"HBA1C\", \"[id number]\"  Lab Results   Component Value Date/Time    CHOL 144 04/23/2024 09:29 AM    CHOL 152 03/13/2023 01:16 PM    HDL 36 04/23/2024 09:29 AM    LDL 87 04/23/2024 09:29 AM    VLDL 21 04/23/2024 09:29 AM    VLDL 37 03/13/2023 01:16 PM     No results found for: \"VITD3\"    Lab Results   Component Value Date/Time    TSH 1.430 02/04/2025 09:09 AM

## 2025-04-16 RX ORDER — ATORVASTATIN CALCIUM 10 MG/1
10 TABLET, FILM COATED ORAL NIGHTLY
Qty: 30 TABLET | Refills: 0 | Status: SHIPPED | OUTPATIENT
Start: 2025-04-16

## 2025-04-24 RX ORDER — TIRZEPATIDE 7.5 MG/.5ML
INJECTION, SOLUTION SUBCUTANEOUS
Qty: 2 ML | Refills: 0 | Status: SHIPPED | OUTPATIENT
Start: 2025-04-24

## 2025-04-29 ENCOUNTER — OFFICE VISIT (OUTPATIENT)
Facility: CLINIC | Age: 40
End: 2025-04-29
Payer: MEDICARE

## 2025-04-29 VITALS
BODY MASS INDEX: 38.3 KG/M2 | SYSTOLIC BLOOD PRESSURE: 138 MMHG | HEART RATE: 93 BPM | WEIGHT: 289 LBS | OXYGEN SATURATION: 97 % | DIASTOLIC BLOOD PRESSURE: 88 MMHG | HEIGHT: 73 IN

## 2025-04-29 DIAGNOSIS — F25.8 OTHER SCHIZOAFFECTIVE DISORDERS (HCC): ICD-10-CM

## 2025-04-29 DIAGNOSIS — Z11.4 SCREENING FOR HIV WITHOUT PRESENCE OF RISK FACTORS: ICD-10-CM

## 2025-04-29 DIAGNOSIS — E11.9 TYPE 2 DIABETES MELLITUS WITHOUT COMPLICATION, WITHOUT LONG-TERM CURRENT USE OF INSULIN (HCC): Primary | ICD-10-CM

## 2025-04-29 DIAGNOSIS — E11.9 TYPE 2 DIABETES MELLITUS WITHOUT COMPLICATION, WITHOUT LONG-TERM CURRENT USE OF INSULIN (HCC): ICD-10-CM

## 2025-04-29 DIAGNOSIS — E66.01 MORBID (SEVERE) OBESITY DUE TO EXCESS CALORIES (HCC): ICD-10-CM

## 2025-04-29 DIAGNOSIS — Z11.59 NEED FOR HEPATITIS C SCREENING TEST: ICD-10-CM

## 2025-04-29 DIAGNOSIS — E78.2 MIXED HYPERLIPIDEMIA: ICD-10-CM

## 2025-04-29 DIAGNOSIS — I10 ESSENTIAL HYPERTENSION, BENIGN: ICD-10-CM

## 2025-04-29 PROCEDURE — G8417 CALC BMI ABV UP PARAM F/U: HCPCS | Performed by: INTERNAL MEDICINE

## 2025-04-29 PROCEDURE — 3079F DIAST BP 80-89 MM HG: CPT | Performed by: INTERNAL MEDICINE

## 2025-04-29 PROCEDURE — 1036F TOBACCO NON-USER: CPT | Performed by: INTERNAL MEDICINE

## 2025-04-29 PROCEDURE — 3051F HG A1C>EQUAL 7.0%<8.0%: CPT | Performed by: INTERNAL MEDICINE

## 2025-04-29 PROCEDURE — 99214 OFFICE O/P EST MOD 30 MIN: CPT | Performed by: INTERNAL MEDICINE

## 2025-04-29 PROCEDURE — 2022F DILAT RTA XM EVC RTNOPTHY: CPT | Performed by: INTERNAL MEDICINE

## 2025-04-29 PROCEDURE — 3075F SYST BP GE 130 - 139MM HG: CPT | Performed by: INTERNAL MEDICINE

## 2025-04-29 PROCEDURE — G8427 DOCREV CUR MEDS BY ELIG CLIN: HCPCS | Performed by: INTERNAL MEDICINE

## 2025-04-29 SDOH — ECONOMIC STABILITY: FOOD INSECURITY: WITHIN THE PAST 12 MONTHS, THE FOOD YOU BOUGHT JUST DIDN'T LAST AND YOU DIDN'T HAVE MONEY TO GET MORE.: NEVER TRUE

## 2025-04-29 SDOH — ECONOMIC STABILITY: FOOD INSECURITY: WITHIN THE PAST 12 MONTHS, YOU WORRIED THAT YOUR FOOD WOULD RUN OUT BEFORE YOU GOT MONEY TO BUY MORE.: NEVER TRUE

## 2025-04-29 ASSESSMENT — ENCOUNTER SYMPTOMS
NAUSEA: 0
ABDOMINAL PAIN: 0
COUGH: 0
DIARRHEA: 0
SHORTNESS OF BREATH: 0
VOMITING: 0

## 2025-04-29 ASSESSMENT — PATIENT HEALTH QUESTIONNAIRE - PHQ9
9. THOUGHTS THAT YOU WOULD BE BETTER OFF DEAD, OR OF HURTING YOURSELF: NOT AT ALL
3. TROUBLE FALLING OR STAYING ASLEEP: NOT AT ALL
SUM OF ALL RESPONSES TO PHQ QUESTIONS 1-9: 0
6. FEELING BAD ABOUT YOURSELF - OR THAT YOU ARE A FAILURE OR HAVE LET YOURSELF OR YOUR FAMILY DOWN: NOT AT ALL
7. TROUBLE CONCENTRATING ON THINGS, SUCH AS READING THE NEWSPAPER OR WATCHING TELEVISION: NOT AT ALL
2. FEELING DOWN, DEPRESSED OR HOPELESS: NOT AT ALL
SUM OF ALL RESPONSES TO PHQ QUESTIONS 1-9: 0
10. IF YOU CHECKED OFF ANY PROBLEMS, HOW DIFFICULT HAVE THESE PROBLEMS MADE IT FOR YOU TO DO YOUR WORK, TAKE CARE OF THINGS AT HOME, OR GET ALONG WITH OTHER PEOPLE: NOT DIFFICULT AT ALL
4. FEELING TIRED OR HAVING LITTLE ENERGY: NOT AT ALL
8. MOVING OR SPEAKING SO SLOWLY THAT OTHER PEOPLE COULD HAVE NOTICED. OR THE OPPOSITE, BEING SO FIGETY OR RESTLESS THAT YOU HAVE BEEN MOVING AROUND A LOT MORE THAN USUAL: NOT AT ALL
5. POOR APPETITE OR OVEREATING: NOT AT ALL
SUM OF ALL RESPONSES TO PHQ QUESTIONS 1-9: 0
1. LITTLE INTEREST OR PLEASURE IN DOING THINGS: NOT AT ALL
SUM OF ALL RESPONSES TO PHQ QUESTIONS 1-9: 0

## 2025-04-29 NOTE — PROGRESS NOTES
[facility]  [address], [place] [address]  Phone: [phone]      Erickson Posey (: 1985) is a 39 y.o. male, established patient, here for evaluation of the following chief complaint(s):  Follow-up and Discuss Labs     SUBJECTIVE/OBJECTIVE:  History of Present Illness  The patient is a 39-year-old male with a past medical history of type 2 diabetes, mixed hyperlipidemia, obesity, and schizoaffective disorder. He is seen today for a follow-up of diabetes and a review of his blood test done on 2025.    Blood glucose levels are monitored at home, typically ranging between 110 and 120. Dietary modifications have been made, including reducing overall food intake and eliminating soda from the diet. More salads and vegetables are being incorporated into meals, and salt intake is being limited due to its potential impact on blood pressure. Current medication regimen includes Mounjaro 7.5 mg administered weekly on , Janumet taken twice daily, and Jardiance.    A positive emotional and mental state is reported. Current mental health medications include Seroquel, risperidone, and trazodone.    An ophthalmologist, Dr. Kris Amos, was consulted recently, who reported no signs of glaucoma or diabetic retinopathy.    Lipitor 10 mg is taken for cholesterol management.    Metoprolol 25 mg is taken, one and a half tablets daily.    Blood test results indicate that blood sugar was 115, and A1c increased to 7.5 from 6.4 last year. Thyroid function is normal, and no protein leakage is found in the urine, although sugar is present due to diabetes. Calcium levels are at 9.5, and hemoglobin levels are normal, indicating no anemia.        Hemoglobin A1C   Date Value Ref Range Status   2025 7.5 (H) 4.8 - 5.6 % Final     Comment:                 Prediabetes: 5.7 - 6.4           Diabetes: >6.4           Glycemic control for adults with diabetes: <7.0

## 2025-04-29 NOTE — PROGRESS NOTES
.  Chief Complaint   Patient presents with    Follow-up    Discuss Labs   .  Have you been to the ER, urgent care clinic since your last visit?  Hospitalized since your last visit?   NO    Have you seen or consulted any other health care providers outside our system since your last visit?   NO      “Have you had a diabetic eye exam?”    YES - Where: Can't remember where it was done.  Nurse/CMA to request most recent records if not in the chart     Date of last diabetic eye exam: 2/15/2024     ./88 (BP Site: Right Upper Arm, Patient Position: Sitting, BP Cuff Size: Large Adult)   Pulse 93   Ht 1.854 m (6' 1\")   Wt 131.1 kg (289 lb)   SpO2 97%   BMI 38.13 kg/m²

## 2025-05-14 RX ORDER — ATORVASTATIN CALCIUM 10 MG/1
10 TABLET, FILM COATED ORAL NIGHTLY
Qty: 30 TABLET | Refills: 2 | Status: SHIPPED | OUTPATIENT
Start: 2025-05-14

## 2025-05-23 DIAGNOSIS — E11.9 TYPE 2 DIABETES MELLITUS WITHOUT COMPLICATION, WITHOUT LONG-TERM CURRENT USE OF INSULIN (HCC): ICD-10-CM

## 2025-05-23 RX ORDER — TIRZEPATIDE 10 MG/.5ML
INJECTION, SOLUTION SUBCUTANEOUS
Qty: 2 ML | Refills: 2 | Status: SHIPPED | OUTPATIENT
Start: 2025-05-23

## 2025-06-03 RX ORDER — SITAGLIPTIN AND METFORMIN HYDROCHLORIDE 1000; 50 MG/1; MG/1
TABLET, FILM COATED ORAL
Qty: 60 TABLET | Refills: 2 | Status: SHIPPED | OUTPATIENT
Start: 2025-06-03

## 2025-06-26 ENCOUNTER — OFFICE VISIT (OUTPATIENT)
Facility: CLINIC | Age: 40
End: 2025-06-26

## 2025-06-26 VITALS
HEIGHT: 73 IN | SYSTOLIC BLOOD PRESSURE: 123 MMHG | BODY MASS INDEX: 36.18 KG/M2 | OXYGEN SATURATION: 99 % | DIASTOLIC BLOOD PRESSURE: 84 MMHG | WEIGHT: 273 LBS | HEART RATE: 90 BPM

## 2025-06-26 DIAGNOSIS — E66.9 OBESITY (BMI 30-39.9): ICD-10-CM

## 2025-06-26 DIAGNOSIS — R79.89 ELEVATED SERUM CREATININE: ICD-10-CM

## 2025-06-26 DIAGNOSIS — E78.2 MIXED HYPERLIPIDEMIA: ICD-10-CM

## 2025-06-26 DIAGNOSIS — I10 ESSENTIAL HYPERTENSION, BENIGN: ICD-10-CM

## 2025-06-26 DIAGNOSIS — L60.2 HYPERTROPHY OF NAIL: ICD-10-CM

## 2025-06-26 DIAGNOSIS — E11.9 ENCOUNTER FOR DIABETIC FOOT EXAM (HCC): ICD-10-CM

## 2025-06-26 DIAGNOSIS — E11.9 TYPE 2 DIABETES MELLITUS WITHOUT COMPLICATION, WITHOUT LONG-TERM CURRENT USE OF INSULIN (HCC): Primary | ICD-10-CM

## 2025-06-26 PROBLEM — E66.01 MORBID (SEVERE) OBESITY DUE TO EXCESS CALORIES (HCC): Status: RESOLVED | Noted: 2025-04-29 | Resolved: 2025-06-26

## 2025-06-26 PROBLEM — R03.0 DIASTOLIC BLOOD PRESSURE 90 MM HG OR HIGHER: Status: RESOLVED | Noted: 2022-10-12 | Resolved: 2025-06-26

## 2025-06-26 LAB
ALBUMIN SERPL-MCNC: 4.6 G/DL (ref 4.1–5.1)
ALBUMIN/CREAT UR: <2 MG/G CREAT (ref 0–29)
ALP SERPL-CCNC: 52 IU/L (ref 44–121)
ALT SERPL-CCNC: 31 IU/L (ref 0–44)
AST SERPL-CCNC: 25 IU/L (ref 0–40)
BILIRUB SERPL-MCNC: 0.3 MG/DL (ref 0–1.2)
BUN SERPL-MCNC: 12 MG/DL (ref 6–20)
BUN/CREAT SERPL: 9 (ref 9–20)
CALCIUM SERPL-MCNC: 9.4 MG/DL (ref 8.7–10.2)
CHLORIDE SERPL-SCNC: 103 MMOL/L (ref 96–106)
CHOLEST SERPL-MCNC: 149 MG/DL (ref 100–199)
CO2 SERPL-SCNC: 21 MMOL/L (ref 20–29)
CREAT SERPL-MCNC: 1.31 MG/DL (ref 0.76–1.27)
CREAT UR-MCNC: 150.9 MG/DL
EGFRCR SERPLBLD CKD-EPI 2021: 71 ML/MIN/1.73
GLOBULIN SER CALC-MCNC: 2.3 G/DL (ref 1.5–4.5)
GLUCOSE SERPL-MCNC: 88 MG/DL (ref 70–99)
HBA1C MFR BLD: 6.3 % (ref 4.8–5.6)
HCV IGG SERPL QL IA: NON REACTIVE
HDLC SERPL-MCNC: 36 MG/DL
HIV 1+2 AB+HIV1 P24 AG SERPL QL IA: NON REACTIVE
LDLC SERPL CALC-MCNC: 97 MG/DL (ref 0–99)
MICROALBUMIN UR-MCNC: <3 UG/ML
POTASSIUM SERPL-SCNC: 4.4 MMOL/L (ref 3.5–5.2)
PROT SERPL-MCNC: 6.9 G/DL (ref 6–8.5)
SODIUM SERPL-SCNC: 140 MMOL/L (ref 134–144)
TRIGL SERPL-MCNC: 86 MG/DL (ref 0–149)
VLDLC SERPL CALC-MCNC: 16 MG/DL (ref 5–40)

## 2025-06-26 RX ORDER — SITAGLIPTIN AND METFORMIN HYDROCHLORIDE 1000; 50 MG/1; MG/1
1 TABLET, FILM COATED ORAL DAILY
Qty: 30 TABLET | Refills: 4 | Status: SHIPPED | OUTPATIENT
Start: 2025-06-26

## 2025-06-26 RX ORDER — AMMONIUM LACTATE 12 G/100G
LOTION TOPICAL
Qty: 225 G | Refills: 0 | Status: SHIPPED | OUTPATIENT
Start: 2025-06-26

## 2025-06-26 ASSESSMENT — ENCOUNTER SYMPTOMS
NAUSEA: 0
ABDOMINAL PAIN: 0

## 2025-06-26 NOTE — PROGRESS NOTES
[facility]  [address], [place] [address]  Phone: [phone]      Erickson Posey (: 1985) is a 39 y.o. male, established patient, here for evaluation of the following chief complaint(s):  Follow-up and Discuss Labs     SUBJECTIVE/OBJECTIVE:  History of Present Illness  The patient is a 39-year-old male with a past medical history of hypertension, hypercholesterolemia, type 2 diabetes, Obesity and schizoaffective disorder. He is seen today for follow-up of blood pressure, diabetes, and review of his recent blood test. He is accompanied by his daily service provider,[place].    He reports  weight loss, currently weighing approximately 272 to 273 pounds, down from 289 pounds on 2025. His blood glucose levels have been well-controlled, typically ranging between the 80s and 90s, occasionally reaching the mid-100s. He underwent an eye examination 3 to 4 months ago, which revealed no signs of glaucoma or diabetic retinopathy. He maintains adequate hydration and abstains from soda consumption.     His current medication regimen includes atorvastatin, metoprolol 25 mg (1.5 tablets), Jardiance 25 mg, Janumet (1 tablet twice daily), Mounjaro injections, fenofibrate, Seroquel, risperidone, and trazodone.    He has not had a recent consultation with his psychiatrist but plans to schedule one soon.    He does not require any medication refills at this time. He does not use ibuprofen, Advil, or Aleve.    He also reports dry skin and a fungal infection on his feet. He has been applying lotion to his feet but has not found it effective. He has not sought care from a podiatrist.      Results  Labs;2025   - Blood sugar: 88   - A1c: 6.3   - Total cholesterol: 149   - LDL: 97   - HDL: 36   - Creatinine: Slightly high   - HIV test: 2025, Negative   - Hepatitis C test: 2025, Negative     Hemoglobin A1C   Date Value Ref Range Status   2025 6.3 (H) 4.8

## 2025-07-14 RX ORDER — METOPROLOL SUCCINATE 25 MG/1
TABLET, EXTENDED RELEASE ORAL
Qty: 45 TABLET | Refills: 12 | Status: SHIPPED | OUTPATIENT
Start: 2025-07-14

## 2025-07-14 RX ORDER — FENOFIBRATE 145 MG/1
TABLET, FILM COATED ORAL DAILY
Qty: 30 TABLET | Status: SHIPPED | OUTPATIENT
Start: 2025-07-14

## 2025-07-18 DIAGNOSIS — E11.9 TYPE 2 DIABETES MELLITUS WITHOUT COMPLICATION, WITHOUT LONG-TERM CURRENT USE OF INSULIN (HCC): ICD-10-CM

## 2025-07-19 RX ORDER — TIRZEPATIDE 10 MG/.5ML
INJECTION, SOLUTION SUBCUTANEOUS
Qty: 2 ML | Refills: 2 | Status: SHIPPED | OUTPATIENT
Start: 2025-07-19

## 2025-08-15 DIAGNOSIS — E11.65 TYPE 2 DIABETES MELLITUS WITH HYPERGLYCEMIA (HCC): ICD-10-CM

## 2025-08-15 DIAGNOSIS — E11.65 INADEQUATELY CONTROLLED DIABETES MELLITUS (HCC): ICD-10-CM

## 2025-08-16 RX ORDER — BISMUTH SUBSALICYLATE 262MG/15ML
SUSPENSION, ORAL (FINAL DOSE FORM) ORAL
Qty: 100 EACH | Refills: 1 | Status: SHIPPED | OUTPATIENT
Start: 2025-08-16

## 2025-08-16 RX ORDER — CALCIUM CITRATE/VITAMIN D3 200MG-6.25
TABLET ORAL
Qty: 100 EACH | Refills: 1 | Status: SHIPPED | OUTPATIENT
Start: 2025-08-16

## 2025-08-19 RX ORDER — ATORVASTATIN CALCIUM 10 MG/1
10 TABLET, FILM COATED ORAL NIGHTLY
Qty: 30 TABLET | Refills: 2 | Status: SHIPPED | OUTPATIENT
Start: 2025-08-19